# Patient Record
Sex: MALE | Race: WHITE | Employment: FULL TIME | URBAN - METROPOLITAN AREA
[De-identification: names, ages, dates, MRNs, and addresses within clinical notes are randomized per-mention and may not be internally consistent; named-entity substitution may affect disease eponyms.]

---

## 2019-08-01 ENCOUNTER — ED HISTORICAL/CONVERTED ENCOUNTER (OUTPATIENT)
Dept: OTHER | Age: 54
End: 2019-08-01

## 2020-08-31 ENCOUNTER — IP HISTORICAL/CONVERTED ENCOUNTER (OUTPATIENT)
Dept: OTHER | Age: 55
End: 2020-08-31

## 2020-11-04 ENCOUNTER — TRANSCRIBE ORDER (OUTPATIENT)
Dept: REGISTRATION | Age: 55
End: 2020-11-04

## 2020-11-04 ENCOUNTER — HOSPITAL ENCOUNTER (OUTPATIENT)
Dept: GENERAL RADIOLOGY | Age: 55
Discharge: HOME | End: 2020-11-04
Payer: COMMERCIAL

## 2020-11-04 ENCOUNTER — HOSPITAL ENCOUNTER (OUTPATIENT)
Dept: LAB | Age: 55
Discharge: HOME | End: 2020-11-04
Payer: COMMERCIAL

## 2020-11-04 DIAGNOSIS — J18.9 UNRESOLVED PNEUMONIA: ICD-10-CM

## 2020-11-04 DIAGNOSIS — R74.01 TRANSAMINITIS: Primary | ICD-10-CM

## 2020-11-04 DIAGNOSIS — R74.01 TRANSAMINITIS: ICD-10-CM

## 2020-11-04 DIAGNOSIS — J18.9 UNRESOLVED PNEUMONIA: Primary | ICD-10-CM

## 2020-11-04 LAB
ALBUMIN SERPL-MCNC: 4 G/DL (ref 3.5–5)
ALBUMIN/GLOB SERPL: 1.1 {RATIO} (ref 1.1–2.2)
ALP SERPL-CCNC: 118 U/L (ref 45–117)
ALT SERPL-CCNC: 180 U/L (ref 12–78)
AST SERPL W P-5'-P-CCNC: 82 U/L (ref 15–37)
BILIRUB DIRECT SERPL-MCNC: <0.1 MG/DL (ref 0–0.2)
BILIRUB SERPL-MCNC: 0.4 MG/DL (ref 0.2–1)
GLOBULIN SER CALC-MCNC: 3.8 G/DL (ref 2–4)
PROT SERPL-MCNC: 7.8 G/DL (ref 6.4–8.2)

## 2020-11-04 PROCEDURE — 36415 COLL VENOUS BLD VENIPUNCTURE: CPT

## 2020-11-04 PROCEDURE — 71046 X-RAY EXAM CHEST 2 VIEWS: CPT

## 2020-11-04 PROCEDURE — 80076 HEPATIC FUNCTION PANEL: CPT

## 2021-03-11 ENCOUNTER — OFFICE VISIT (OUTPATIENT)
Dept: HEMATOLOGY | Age: 56
End: 2021-03-11
Payer: COMMERCIAL

## 2021-03-11 VITALS
HEART RATE: 98 BPM | SYSTOLIC BLOOD PRESSURE: 115 MMHG | TEMPERATURE: 98.9 F | OXYGEN SATURATION: 96 % | DIASTOLIC BLOOD PRESSURE: 82 MMHG | WEIGHT: 198.2 LBS | HEIGHT: 73 IN | BODY MASS INDEX: 26.27 KG/M2 | RESPIRATION RATE: 18 BRPM

## 2021-03-11 DIAGNOSIS — R74.8 ELEVATED LIVER ENZYMES: Primary | ICD-10-CM

## 2021-03-11 PROBLEM — Z86.16 HISTORY OF COVID-19: Status: ACTIVE | Noted: 2021-03-11

## 2021-03-11 PROBLEM — Z98.890 H/O SINUS SURGERY: Status: ACTIVE | Noted: 2021-03-11

## 2021-03-11 PROBLEM — R05.3 CHRONIC COUGH: Status: ACTIVE | Noted: 2021-03-11

## 2021-03-11 LAB
ALBUMIN SERPL-MCNC: 3.9 G/DL (ref 3.5–5)
ALBUMIN/GLOB SERPL: 1.1 {RATIO} (ref 1.1–2.2)
ALP SERPL-CCNC: 108 U/L (ref 45–117)
ALT SERPL-CCNC: 89 U/L (ref 12–78)
ANION GAP SERPL CALC-SCNC: 7 MMOL/L (ref 5–15)
AST SERPL-CCNC: 40 U/L (ref 15–37)
BASOPHILS # BLD: 0 K/UL (ref 0–0.1)
BASOPHILS NFR BLD: 1 % (ref 0–1)
BILIRUB DIRECT SERPL-MCNC: 0.1 MG/DL (ref 0–0.2)
BILIRUB SERPL-MCNC: 0.4 MG/DL (ref 0.2–1)
BUN SERPL-MCNC: 13 MG/DL (ref 6–20)
BUN/CREAT SERPL: 13 (ref 12–20)
CALCIUM SERPL-MCNC: 9.4 MG/DL (ref 8.5–10.1)
CHLORIDE SERPL-SCNC: 109 MMOL/L (ref 97–108)
CO2 SERPL-SCNC: 24 MMOL/L (ref 21–32)
COMMENT, HOLDF: NORMAL
CREAT SERPL-MCNC: 1.04 MG/DL (ref 0.7–1.3)
DIFFERENTIAL METHOD BLD: ABNORMAL
EOSINOPHIL # BLD: 0.1 K/UL (ref 0–0.4)
EOSINOPHIL NFR BLD: 2 % (ref 0–7)
ERYTHROCYTE [DISTWIDTH] IN BLOOD BY AUTOMATED COUNT: 14.3 % (ref 11.5–14.5)
GLOBULIN SER CALC-MCNC: 3.6 G/DL (ref 2–4)
GLUCOSE SERPL-MCNC: 97 MG/DL (ref 65–100)
HCT VFR BLD AUTO: 44.6 % (ref 36.6–50.3)
HGB BLD-MCNC: 14.3 G/DL (ref 12.1–17)
IMM GRANULOCYTES # BLD AUTO: 0 K/UL (ref 0–0.04)
IMM GRANULOCYTES NFR BLD AUTO: 1 % (ref 0–0.5)
INR PPP: 1 (ref 0.9–1.1)
LYMPHOCYTES # BLD: 1.6 K/UL (ref 0.8–3.5)
LYMPHOCYTES NFR BLD: 26 % (ref 12–49)
MCH RBC QN AUTO: 26.8 PG (ref 26–34)
MCHC RBC AUTO-ENTMCNC: 32.1 G/DL (ref 30–36.5)
MCV RBC AUTO: 83.5 FL (ref 80–99)
MONOCYTES # BLD: 0.6 K/UL (ref 0–1)
MONOCYTES NFR BLD: 10 % (ref 5–13)
NEUTS SEG # BLD: 3.7 K/UL (ref 1.8–8)
NEUTS SEG NFR BLD: 60 % (ref 32–75)
NRBC # BLD: 0 K/UL (ref 0–0.01)
NRBC BLD-RTO: 0 PER 100 WBC
PLATELET # BLD AUTO: 218 K/UL (ref 150–400)
PMV BLD AUTO: 10 FL (ref 8.9–12.9)
POTASSIUM SERPL-SCNC: 4.2 MMOL/L (ref 3.5–5.1)
PROT SERPL-MCNC: 7.5 G/DL (ref 6.4–8.2)
PROTHROMBIN TIME: 10 SEC (ref 9–11.1)
RBC # BLD AUTO: 5.34 M/UL (ref 4.1–5.7)
SAMPLES BEING HELD,HOLD: NORMAL
SODIUM SERPL-SCNC: 140 MMOL/L (ref 136–145)
WBC # BLD AUTO: 6 K/UL (ref 4.1–11.1)

## 2021-03-11 PROCEDURE — 99204 OFFICE O/P NEW MOD 45 MIN: CPT | Performed by: INTERNAL MEDICINE

## 2021-03-11 RX ORDER — BROMPHENIRAMINE MALEATE, PSEUDOEPHEDRINE HYDROCHLORIDE, AND DEXTROMETHORPHAN HYDROBROMIDE 2; 30; 10 MG/5ML; MG/5ML; MG/5ML
SYRUP ORAL
COMMUNITY
Start: 2021-02-09 | End: 2021-09-27

## 2021-03-11 RX ORDER — ASCORBIC ACID 500 MG
1000 TABLET ORAL DAILY
COMMUNITY

## 2021-03-11 RX ORDER — FLUTICASONE FUROATE 27.5 UG/1
2 SPRAY, METERED NASAL DAILY
COMMUNITY
End: 2021-09-27

## 2021-03-11 RX ORDER — GABAPENTIN 300 MG/1
CAPSULE ORAL
COMMUNITY
Start: 2021-03-07 | End: 2021-04-13

## 2021-03-11 RX ORDER — TIOTROPIUM BROMIDE INHALATION SPRAY 3.12 UG/1
SPRAY, METERED RESPIRATORY (INHALATION)
COMMUNITY
Start: 2020-12-18 | End: 2021-04-13

## 2021-03-11 NOTE — PROGRESS NOTES
Identified pt with two pt identifiers(name and ). Reviewed record in preparation for visit and have obtained necessary documentation. No chief complaint on file. Vitals:    21 0946   BP: 115/82   Pulse: 98   Resp: 18   Temp: 98.9 °F (37.2 °C)   TempSrc: Temporal   SpO2: 96%   Weight: 198 lb 3.2 oz (89.9 kg)   Height: 6' 1\" (1.854 m)   PainSc:   0 - No pain       Health Maintenance Review: Patient reminded of \"due or due soon\" health maintenance. I have asked the patient to contact his/her primary care provider (PCP) for follow-up on his/her health maintenance. Coordination of Care Questionnaire:  :   1) Have you been to an emergency room, urgent care, or hospitalized since your last visit? If yes, where when, and reason for visit? yes     [facility]; August/sept For Covid-19    2. Have seen or consulted any other health care provider since your last visit? If yes, where when, and reason for visit? YES     Pulmonologist  Gastro    Patient is accompanied by self I have received verbal consent from rEum Gil to discuss any/all medical information while they are present in the room.

## 2021-03-11 NOTE — LETTER
3/20/2021 Patient: Yun Negron YOB: 1965 Date of Visit: 3/11/2021 Carolee Parra MD 
[address] [facility] [address] Via Fax: [phone] Jose Miguel Christianson MD 
[place] [address] [facility] [address] Via Fax: [phone] Dear MD Jose Miguel Ayala MD, Thank you for referring Mr. Yun Negron to [facility] for evaluation. My notes for this consultation are attached. If you have questions, please do not hesitate to call me. I look forward to following your patient along with you. Sincerely, Nadine Mckeon MD

## 2021-04-13 ENCOUNTER — OFFICE VISIT (OUTPATIENT)
Dept: HEMATOLOGY | Age: 56
End: 2021-04-13
Payer: COMMERCIAL

## 2021-04-13 VITALS
TEMPERATURE: 96.8 F | RESPIRATION RATE: 18 BRPM | HEIGHT: 73 IN | HEART RATE: 80 BPM | SYSTOLIC BLOOD PRESSURE: 117 MMHG | WEIGHT: 191.4 LBS | OXYGEN SATURATION: 95 % | DIASTOLIC BLOOD PRESSURE: 71 MMHG | BODY MASS INDEX: 25.37 KG/M2

## 2021-04-13 DIAGNOSIS — R74.8 ELEVATED LIVER ENZYMES: Primary | ICD-10-CM

## 2021-04-13 LAB
ALBUMIN SERPL-MCNC: 3.9 G/DL (ref 3.5–5)
ALBUMIN/GLOB SERPL: 1.1 {RATIO} (ref 1.1–2.2)
ALP SERPL-CCNC: 96 U/L (ref 45–117)
ALT SERPL-CCNC: 48 U/L (ref 12–78)
AST SERPL-CCNC: 21 U/L (ref 15–37)
BILIRUB DIRECT SERPL-MCNC: 0.1 MG/DL (ref 0–0.2)
BILIRUB SERPL-MCNC: 0.4 MG/DL (ref 0.2–1)
FERRITIN SERPL-MCNC: 260 NG/ML (ref 26–388)
GLOBULIN SER CALC-MCNC: 3.7 G/DL (ref 2–4)
IRON SATN MFR SERPL: 17 % (ref 20–50)
IRON SERPL-MCNC: 47 UG/DL (ref 35–150)
PROT SERPL-MCNC: 7.6 G/DL (ref 6.4–8.2)
TIBC SERPL-MCNC: 273 UG/DL (ref 250–450)

## 2021-04-13 PROCEDURE — 91200 LIVER ELASTOGRAPHY: CPT | Performed by: NURSE PRACTITIONER

## 2021-04-13 PROCEDURE — 99214 OFFICE O/P EST MOD 30 MIN: CPT | Performed by: NURSE PRACTITIONER

## 2021-04-13 NOTE — PROGRESS NOTES
Estrada Gonzales MD, [address], [place] Migel Gaona, Melany Weaver MD, MPH      Danita Holter, PA-ELIZABETH Parada, [facility], [place]     Shonna Manzanares, [facility]   LIA Fuchs-ELIZABETH Slade, [facility]       [facility] [place] [address]    at [address] [place], [address] Juliana Solitario  22.    [phone]    FAX: [address] [place] [address], [address]    [phone]    FAX: [phone]         Patient Care Team:  Juan David Kuhn MD as PCP - General (Internal Medicine)      Problem List  Date Reviewed: 3/20/2021          Codes Class Noted    Elevated liver enzymes ICD-10-CM: R74.8  ICD-9-CM: 790.5  3/11/2021        Chronic cough ICD-10-CM: R05  ICD-9-CM: 786.2  3/11/2021        History of COVID-19 ICD-10-CM: T28.74  ICD-9-CM: V12.09  3/11/2021        H/O sinus surgery ICD-10-CM: Z98.890  ICD-9-CM: V45.89  3/11/2021              Diana Riggs is being seen at [address] for management of elevated liver enzymes. The active problem list, all pertinent past medical history, medications, radiologic findings and laboratory findings related to the liver disorder were reviewed and discussed with the patient. The patient is a 63 y.o.  male who was found to have elevated liver transaminases in 2018. The patient was on Augmentin and the elevation was thought to be DILI. The liver enzymes remained elevated after discontinuation of the medication. Serologic evaluation for markers of chronic liver disease were negative. The patient was diagnosed with COVID 9/2020, liver enzymes elevated further but returned to baseline elevation after resolution. Imaging of the liver was not performed.      An assessment of liver fibrosis with biopsy or elastography has not been performed. He returns for Fibroscan today. Since the last office visit the patient reports neurological changes and development of a tremor. The patient has the following symptoms which are thought to be due to the liver disease: Fatigue. The patient is not currently experiencing the following symptoms of liver disease:  pain in the right side over the liver, itching, or abdominal swelling. The patient completes all daily activities without any functional limitations. ASSESSMENT AND PLAN:  Elevated liver enzymes  Persistent elevation in liver transaminases of unclear etiology at this time. Have performed laboratory testing to monitor liver function and degree of liver injury. This included BMP, hepatic panel, and CBC with platelet count. Laboratory testing from 3/11/2021 reviewed in detail. The ALP, liver function and platelet count are normal.     Assessment of liver fibrosis was performed with Fibroscan in the office today. The result was 4.6 kPa which correlates with no fibrosis. The CAP score of 360 suggests hepatic steatosis. The CAP score is elevated but the patient has no other risk factors for fatty liver. Discussed dietary changes and he reports not eating high carbohydrate foods such as pasta, bread, potatoes, and sweets. Will order additional testing to evaluate for other causes of liver disease. Based upon laboratory studies and Fibroscan, the patient does not appear to have significant liver injury. Additional serologic testing for causes of chronic liver disease were ordered. The most likely causes for the liver chemistry abnormalities were discussed with the patient and include:   fatty liver disease, immune liver disorders, or genetic metabolic liver disorders. Will perform imaging of the liver with ultrasound.       Screening for Hepatocellular Carcinoma  HCC screening is not necessary if the patient has no evidence of cirrhosis. Treatment of other medical problems in patients with chronic liver disease  There are no contraindications for the patient to take most medications that are necessary for treatment of other medical issues. Counseling for alcohol in patients with chronic liver disease  The patient was counseled regarding alcohol consumption and the effect of alcohol on chronic liver disease. The patient does not consume any significant amount of alcohol. Vaccinations   The need for vaccination against viral hepatitis A and B will be assessed with serologic and instituted as appropriate. Routine vaccinations against other bacterial and viral agents can be performed as indicated. Annual flu vaccination should be administered if indicated. ALLERGIES  Allergies   Allergen Reactions    Amoxicillin Other (comments)     \"Blood work related to the liver comes back elevated\"    Sulfa (Sulfonamide Antibiotics) Hives and Swelling       MEDICATIONS  Current Outpatient Medications   Medication Sig    brompheniramine-pseudoeph-DM (DIMETAPP) 2-30-10 mg/5 mL syrup TAKE 10 ML BY MOUTH EVERY 4 HOURS AS NEEDED    gabapentin (NEURONTIN) 300 mg capsule     Spiriva Respimat 2.5 mcg/actuation inhaler INHALE 2 PUFFS BY MOUTH DAILY. RINSE MOUTH AFTER USE.  ascorbic acid, vitamin C, (Vitamin C) 500 mg tablet Take 1,000 mg by mouth daily.  VITAMIN E ACETATE PO Take 360 mg by mouth.  multivit-min/FA/lycopen/lutein (CENTRUM SILVER MEN PO) Take  by mouth.  fluticasone (Flonase Sensimist) 27.5 mcg/actuation nasal spray 2 Sprays by Nasal route daily. No current facility-administered medications for this visit. SYSTEM REVIEW NOT RELATED TO LIVER DISEASE OR REVIEWED ABOVE:  Constitution systems: Negative for fever, chills, weight gain, weight loss. Eyes: Negative for visual changes. ENT: Negative for sore throat, painful swallowing. Respiratory: Negative for cough, hemoptysis, SOB. Cardiology: Negative for chest pain, palpitations. GI:  Negative for constipation or diarrhea. : Negative for urinary frequency, dysuria, hematuria, nocturia. Skin: Negative for rash. Hematology: Negative for easy bruising, blood clots. Musculo-skeletal: Negative for back pain, muscle pain, weakness. Neurologic: Negative for headaches, dizziness, vertigo, memory problems not related to HE. New onset resting tremor. Psychology: Negative for anxiety, depression. FAMILY HISTORY:  The father  of DM. The mother Has/had the following chronic disease(s): breast cancer. There is a grandfather with cirrhosis    SOCIAL HISTORY:  The patient is . The patient has 3 children,   The patient has never used tobacco products. The patient consumes alcohol on social occasions never in excess. The patient currently works full time as . PHYSICAL EXAMINATION:  Visit Vitals  /71 (BP 1 Location: Right upper arm, BP Patient Position: Sitting, BP Cuff Size: Adult)   Pulse 80   Temp 96.8 °F (36 °C) (Temporal)   Resp 18   Ht 6' 1\" (1.854 m)   Wt 191 lb 6.4 oz (86.8 kg)   SpO2 95%   BMI 25.25 kg/m²       General: No acute distress. Eyes: Sclera anicteric. ENT: No oral lesions. Thyroid normal.  Nodes: No adenopathy. Skin: No spider angiomata. No jaundice. No palmar erythema. Respiratory: Lungs clear to auscultation. Cardiovascular: Regular heart rate. No murmurs. No JVD. Abdomen: Soft non-tender, liver size normal to percussion/palpation. Spleen not palpable. No obvious ascites. Extremities: No edema. No muscle wasting. No gross arthritic changes. Neurologic: Alert and oriented. Cranial nerves grossly intact. No asterixis. Tremor involving head movements observed.      LABORATORY STUDIES:  Liver [place] of [address] & Units 3/11/2021 2020   WBC 4.1 - 11.1 K/uL 6.0    ANC 1.8 - 8.0 K/UL 3.7    HGB 12.1 - 17.0 g/dL 14.3     - 400 K/uL 218    INR 0.9 - 1.1   1.0    AST 15 - 37 U/L 40 (H) 82 (H)   ALT 12 - 78 U/L 89 (H) 180 (H)   Alk Phos 45 - 117 U/L 108 118 (H)   Bili, Total 0.2 - 1.0 MG/DL 0.4 0.4   Bili, Direct 0.0 - 0.2 MG/DL 0.1 <0.1   Albumin 3.5 - 5.0 g/dL 3.9 4.0   BUN 6 - 20 MG/DL 13    Creat 0.70 - 1.30 MG/DL 1.04    Na 136 - 145 mmol/L 140    K 3.5 - 5.1 mmol/L 4.2    Cl 97 - 108 mmol/L 109 (H)    CO2 21 - 32 mmol/L 24      Laboratory testing from 3/11/2021 reviewed in detail. Additional testing included to evaluate progression or regression of disease. Laboratory testing results from today will be communicated by phone or mail. SEROLOGIES:  2/2018. anti-HBcore negative, anti-HCV negative, Ferritin 348, iron saturation 39%, MOI negative, Anti-LKM negative, AMA negative, ceruloplsmin 31, alpha-1-antitrypsin 95, TSH 2.4    LIVER HISTOLOGY:  4/2021. [facility]. EkPa was 4.6. . The results suggested a fibrosis level of F0. The CAP score suggests there is hepatic steatosis. ENDOSCOPIC PROCEDURES:  3/2018. Colonoscopy. RADIOLOGY:  Not available or performed    OTHER TESTING:  Not available or performed    FOLLOW-UP:  All of the issues listed above in the Assessment and Plan were discussed with the patient. All questions were answered. The patient expressed a clear understanding of the above. [facility] 87 in 3 months. Imaging will be performed in the next 1-2 weeks. April JEEVAN Bell-BC  [address] [address] Asc[address] Nugent, [address] [facility] [address].  [address] [facility]

## 2021-04-13 NOTE — PROGRESS NOTES
Identified pt with two pt identifiers(name and ). Reviewed record in preparation for visit and have obtained necessary documentation. No chief complaint on file. Vitals:    21 1416   BP: 117/71   Pulse: 80   Resp: 18   Temp: 96.8 °F (36 °C)   TempSrc: Temporal   SpO2: 95%   Weight: 191 lb 6.4 oz (86.8 kg)   Height: 6' 1\" (1.854 m)   PainSc:   0 - No pain       Health Maintenance Review: Patient reminded of \"due or due soon\" health maintenance. I have asked the patient to contact his/her primary care provider (PCP) for follow-up on his/her health maintenance. Coordination of Care Questionnaire:  :   1) Have you been to an emergency room, urgent care, or hospitalized since your last visit? If yes, where when, and reason for visit? no       2. Have seen or consulted any other health care provider since your last visit? If yes, where when, and reason for visit? NO      Patient is accompanied by self I have received verbal consent from Karol Renee to discuss any/all medical information while they are present in the room.

## 2021-04-15 LAB
A1AT SERPL-MCNC: 123 MG/DL (ref 101–187)
ANA TITR SER IF: NEGATIVE {TITER}
C-ANCA TITR SER IF: NORMAL TITER
CERULOPLASMIN SERPL-MCNC: 30.4 MG/DL (ref 16–31)
P-ANCA ATYPICAL TITR SER IF: NORMAL TITER
P-ANCA TITR SER IF: NORMAL TITER

## 2021-04-16 NOTE — PROGRESS NOTES
Letter sent to the patient regarding the blood work results. The liver numbers continue to improve and testing for other causes of liver disease were negative. We will see him back in 3 months.

## 2021-04-17 LAB — ACTIN IGG SERPL-ACNC: 27 UNITS (ref 0–19)

## 2021-04-28 NOTE — PROGRESS NOTES
The ASMA was slightly elevated. The liver numbers are improving with diet changes. This may be falsely elevated due to inflammation. Will monitor closely.

## 2021-05-12 ENCOUNTER — HOSPITAL ENCOUNTER (OUTPATIENT)
Dept: ULTRASOUND IMAGING | Age: 56
Discharge: HOME | End: 2021-05-12
Payer: COMMERCIAL

## 2021-05-12 DIAGNOSIS — R74.8 ELEVATED LIVER ENZYMES: ICD-10-CM

## 2021-05-12 PROCEDURE — 76700 US EXAM ABDOM COMPLETE: CPT

## 2021-05-19 NOTE — PROGRESS NOTES
Letter sent to the patient regarding the ultrasound result which demonstrates no concerning liver mass or lesion and changes consistent with steatosis.

## 2021-06-21 ENCOUNTER — OFFICE VISIT (OUTPATIENT)
Dept: NEUROLOGY | Age: 56
End: 2021-06-21
Payer: COMMERCIAL

## 2021-06-21 VITALS
HEART RATE: 92 BPM | WEIGHT: 195 LBS | SYSTOLIC BLOOD PRESSURE: 122 MMHG | DIASTOLIC BLOOD PRESSURE: 78 MMHG | OXYGEN SATURATION: 97 % | BODY MASS INDEX: 25.84 KG/M2 | HEIGHT: 73 IN

## 2021-06-21 DIAGNOSIS — Z86.16 PERSONAL HISTORY OF COVID-19: ICD-10-CM

## 2021-06-21 DIAGNOSIS — G25.0 BENIGN HEAD TREMOR: Primary | ICD-10-CM

## 2021-06-21 DIAGNOSIS — Z92.3 STATUS POST GAMMA KNIFE TREATMENT: ICD-10-CM

## 2021-06-21 DIAGNOSIS — Z86.018 HISTORY OF BENIGN SCHWANNOMA: ICD-10-CM

## 2021-06-21 PROCEDURE — 99243 OFF/OP CNSLTJ NEW/EST LOW 30: CPT | Performed by: PSYCHIATRY & NEUROLOGY

## 2021-06-21 NOTE — PROGRESS NOTES
Chief Complaint   Patient presents with    Neurologic Problem     twitching around the neck, \"Started last year after I had covid, but its becoming more frequent especially in the afternoon. \"       Referred by: Dr. Mathew Mike      HPI    Mr. Ilana Simon is a 25-year-old gentleman with a history of right-sided schwannoma status post gamma knife with residual hearing loss who is here because of head shaking. He is not sure how long this has been going on exactly but he thinks it started after a Covid infection in fall 2020. His infection was severe enough he was in the hospital for a week on supplemental oxygen but not in the critical care unit. He is since recovered pulmonary wise from that issue but the tremor is getting worse such that his family is noticing it mainly in the evenings after he gets home. He also thinks it might be a little bit in the right hand. He spends all day working remotely from home and is very focused and he has not noticed it. No speech changes or vision changes. No loss of strength. No numbness. Symptoms do not interfere with daily activity. Nobody in the family he can think of with tremor. No difficulty swallowing. Review of Systems   Gastrointestinal: Negative for constipation. Neurological: Positive for tremors. Psychiatric/Behavioral: Negative for hallucinations. All other systems reviewed and are negative. No past medical history on file. No family history on file. Social History     Socioeconomic History    Marital status:      Spouse name: Not on file    Number of children: Not on file    Years of education: Not on file    Highest education level: Not on file   Occupational History    Not on file   Tobacco Use    Smoking status: Former Smoker     Types: Cigarettes    Smokeless tobacco: Never Used   Vaping Use    Vaping Use: Never used   Substance and Sexual Activity    Alcohol use:  Yes    Drug use: Not Currently    Sexual activity: Not on file   Other Topics Concern    Not on file   Social History Narrative    Not on file     Social Determinants of Health     Financial Resource Strain:     Difficulty of Paying Living Expenses:    Food Insecurity:     Worried About Running Out of Food in the Last Year:     [address] in the Last Year:    Transportation Needs:     Lack of Transportation (Medical):  Lack of Transportation (Non-Medical):    Physical Activity:     Days of Exercise per Week:     Minutes of Exercise per Session:    Stress:     Feeling of Stress :    Social Connections:     Frequency of Communication with Friends and Family:     Frequency of Social Gatherings with Friends and Family:     Attends [employer] Services:     Active Member of Clubs or Organizations:     Attends Club or Organization Meetings:     Marital Status:    Intimate Partner Violence:     Fear of Current or Ex-Partner:     Emotionally Abused:     Physically Abused:     Sexually Abused:      Current Outpatient Medications   Medication Sig    ascorbic acid, vitamin C, (Vitamin C) 500 mg tablet Take 1,000 mg by mouth daily.  VITAMIN E ACETATE PO Take 360 mg by mouth.  multivit-min/FA/lycopen/lutein (CENTRUM SILVER MEN PO) Take  by mouth.  fluticasone (Flonase Sensimist) 27.5 mcg/actuation nasal spray 2 Sprays by Nasal route daily.  brompheniramine-pseudoeph-DM (DIMETAPP) 2-30-10 mg/5 mL syrup TAKE 10 ML BY MOUTH EVERY 4 HOURS AS NEEDED (Patient not taking: Reported on 6/21/2021)     No current facility-administered medications for this visit. Allergies   Allergen Reactions    Amoxicillin Other (comments)     \"Blood work related to the liver comes back elevated\"    Sulfa (Sulfonamide Antibiotics) Hives and Swelling         Neurologic Exam     Mental Status   Oriented to person, place, and time. Cranial Nerves   Hearing loss on the right with right ptosis     Motor Exam   Muscle bulk: normal    Strength   Strength 5/5 throughout. Sensory Exam   Light touch normal.     Gait, Coordination, and Reflexes     Gait  Gait: normal    Tremor   Resting tremor: absent  Intention tremor: present (Kinetic tremor bilaterally)No clear postural tremor     Physical Exam  Vitals and nursing note reviewed. Neurological:      Mental Status: He is oriented to person, place, and time. Gait: Gait is intact. Deep Tendon Reflexes: Strength normal.       Visit Vitals  /78 (BP 1 Location: Left upper arm, BP Patient Position: Sitting)   Pulse 92   Ht 6' 1\" (1.854 m)   Wt 195 lb (88.5 kg)   SpO2 97%   BMI 25.73 kg/m²       Lab Results   Component Value Date/Time    WBC 6.0 03/11/2021 10:35 AM    HGB 14.3 03/11/2021 10:35 AM    HCT 44.6 03/11/2021 10:35 AM    PLATELET 807 40/29/5630 10:35 AM    MCV 83.5 03/11/2021 10:35 AM     Lab Results   Component Value Date/Time    Glucose 97 03/11/2021 10:35 AM    Creatinine 1.04 03/11/2021 10:35 AM      No results found for: CHOL, CHOLPOCT, HDL, LDL, LDLC, LDLCPOC, LDLCEXT, TRIGL, TGLPOCT, CHHD, CHHDX  Lab Results   Component Value Date/Time    ALT (SGPT) 48 04/13/2021 03:05 PM    Alk. phosphatase 96 04/13/2021 03:05 PM    Bilirubin, direct 0.1 04/13/2021 03:05 PM    Bilirubin, total 0.4 04/13/2021 03:05 PM    Albumin 3.9 04/13/2021 03:05 PM    Protein, total 7.6 04/13/2021 03:05 PM    INR 1.0 03/11/2021 10:35 AM    Prothrombin time 10.0 03/11/2021 10:35 AM    PLATELET 041 92/76/7578 10:35 AM        CT Results (maximum last 3): No results found for this or any previous visit. MRI Results (maximum last 3): No results found for this or any previous visit. PET Results (maximum last 3): No results found for this or any previous visit. Assessment and Plan   Diagnoses and all orders for this visit:    1. Benign head tremor  -     MRI BRAIN WO CONT; Future    2. History of benign schwannoma  -     MRI BRAIN WO CONT; Future    3.  Status post gamma knife treatment  -     MRI BRAIN WO CONT; Future    4. Personal history of COVID-23     48-year-old gentleman who is presenting with a new head tremor. I was able to observe this in the clinic. It looks like a benign head tremor. Unclear how to correlate this with timing of Covid infection. I think I would follow this clinically. However, given the history of gamma knife surgery and schwannoma I am going to get a new brain scan to make sure were not dealing with anything new particularly given the history of Covid infection. I offered treatment however because the symptoms were not debilitating I think it is reasonable to forego treatment and he agrees. I would like to see him after the new year to reassess. The next time he has any type of alcoholic beverage I would recommend he see if the tremor gets less as well. We will be in touch after the imaging is done. If things get worse prior to that let us know otherwise I will see him in follow-up. 45 minutes of time was spent reviewing the medical record with a significant amount of time face-to-face discussing his symptoms and visualizing the symptoms, completion of documentation. I reviewed and decided to continue the current medications. This clinical note was dictated with an electronic dictation software that can make unintentional errors. If there are any questions, please contact me directly for clarification. A notice of this visit/encounter being completed has been sent electronically to the patient's PCP and/or referring provider.      [address] [facility], [facility] [address]  Diplomate CATHY

## 2021-06-21 NOTE — LETTER
6/21/2021 Patient: Giovanni Rueda YOB: 1965 Date of Visit: 6/21/2021 Alma Dangelo MD 
[address] [address] [address] Fax: [phone] Dear Alma Dangelo MD, Thank you for referring Mr. Giovanni Rueda to [address] [place] [address] for evaluation. My notes for this consultation are attached. If you have questions, please do not hesitate to call me. I look forward to following your patient along with you. Sincerely, [address] [place] [address], DO 
 
6/21/2021 Patient:  Giovanni Rueda YOB: 1965 Date of Visit: 6/21/2021 Dear Alma Dangelo MD 
[address] [place] [address] Via Fax: [phone]: I was requested by Jass Montalvo MD to evaluate Mr. Giovanni Rueda  for Chief Complaint Patient presents with  Neurologic Problem  
  twitching around the neck, \"Started last year after I had covid, but its becoming more frequent especially in the afternoon. \" .  
 
I am recommending the following:  
 
Diagnoses and all orders for this visit: 1. Benign head tremor -     MRI BRAIN WO CONT; Future 2. History of benign schwannoma -     MRI BRAIN WO CONT; Future 3. Status post gamma knife treatment -     MRI BRAIN WO CONT; Future 4. Personal history of COVID-19 
 
 
 
---------------------------------------------------------------------------------------------------------------------- Below is my encounter: Chief Complaint Patient presents with  Neurologic Problem  
  twitching around the neck, \"Started last year after I had covid, but its becoming more frequent especially in the afternoon. \"  
 
 
Referred by: Dr. Epperson Officer HPI Mr. Kathe Bates is a 58-year-old gentleman with a history of right-sided schwannoma status post gamma knife with residual hearing loss who is here because of head shaking.   He is not sure how long this has been going on exactly but he thinks it started after a Covid infection in fall 2020. His infection was severe enough he was in the hospital for a week on supplemental oxygen but not in the critical care unit. He is since recovered pulmonary wise from that issue but the tremor is getting worse such that his family is noticing it mainly in the evenings after he gets home. He also thinks it might be a little bit in the right hand. He spends all day working remotely from home and is very focused and he has not noticed it. No speech changes or vision changes. No loss of strength. No numbness. Symptoms do not interfere with daily activity. Nobody in the family he can think of with tremor. No difficulty swallowing. Review of Systems Gastrointestinal: Negative for constipation. Neurological: Positive for tremors. Psychiatric/Behavioral: Negative for hallucinations. All other systems reviewed and are negative. No past medical history on file. No family history on file. Social History Socioeconomic History  Marital status:  Spouse name: Not on file  Number of children: Not on file  Years of education: Not on file  Highest education level: Not on file Occupational History  Not on file Tobacco Use  Smoking status: Former Smoker Types: Cigarettes  Smokeless tobacco: Never Used Vaping Use  Vaping Use: Never used Substance and Sexual Activity  Alcohol use: Yes  Drug use: Not Currently  Sexual activity: Not on file Other Topics Concern  Not on file Social History Narrative  Not on file Social Determinants of Health Financial Resource Strain:  Difficulty of Paying Living Expenses:   
Food Insecurity:  Worried About [address] in the Last Year:   
[address] in the Last Year:   
Transportation Needs:   
 Lack of Transportation (Medical):  Lack of Transportation (Non-Medical): Physical Activity:   
 Days of Exercise per Week:  Minutes of Exercise per Session:   
Stress:  Feeling of Stress :   
Social Connections:  Frequency of Communication with Friends and Family:  Frequency of Social Gatherings with Friends and Family:  Attends [employer] Services:  Active Member of Clubs or Organizations:  Attends Club or Organization Meetings:  Marital Status: Intimate Partner Violence:  Fear of Current or Ex-Partner:  Emotionally Abused:   
 Physically Abused:   
 Sexually Abused:   
 
Current Outpatient Medications Medication Sig  
 ascorbic acid, vitamin C, (Vitamin C) 500 mg tablet Take 1,000 mg by mouth daily.  VITAMIN E ACETATE PO Take 360 mg by mouth.  multivit-min/FA/lycopen/lutein (CENTRUM SILVER MEN PO) Take  by mouth.  fluticasone (Flonase Sensimist) 27.5 mcg/actuation nasal spray 2 Sprays by Nasal route daily.  brompheniramine-pseudoeph-DM (DIMETAPP) 2-30-10 mg/5 mL syrup TAKE 10 ML BY MOUTH EVERY 4 HOURS AS NEEDED (Patient not taking: Reported on 6/21/2021) No current facility-administered medications for this visit. Allergies Allergen Reactions  Amoxicillin Other (comments) \"Blood work related to the liver comes back elevated\"  Sulfa (Sulfonamide Antibiotics) Hives and Swelling Neurologic Exam  
 
Mental Status Oriented to person, place, and time. Cranial Nerves Hearing loss on the right with right ptosis Motor Exam  
Muscle bulk: normal 
 
Strength Strength 5/5 throughout. Sensory Exam  
Light touch normal.  
 
Gait, Coordination, and Reflexes Gait Gait: normal 
 
Tremor Resting tremor: absent Intention tremor: present (Kinetic tremor bilaterally)No clear postural tremor Physical Exam 
Vitals and nursing note reviewed. Neurological:  
   Mental Status: He is oriented to person, place, and time. Gait: Gait is intact. Deep Tendon Reflexes: Strength normal.  
 
 
Visit Vitals /78 (BP 1 Location: Left upper arm, BP Patient Position: Sitting) Pulse 92 Ht 6' 1\" (1.854 m) Wt 195 lb (88.5 kg) SpO2 97% BMI 25.73 kg/m² Lab Results Component Value Date/Time WBC 6.0 03/11/2021 10:35 AM  
 HGB 14.3 03/11/2021 10:35 AM  
 HCT 44.6 03/11/2021 10:35 AM  
 PLATELET 585 29/72/9780 10:35 AM  
 MCV 83.5 03/11/2021 10:35 AM  
 
Lab Results Component Value Date/Time Glucose 97 03/11/2021 10:35 AM  
 Creatinine 1.04 03/11/2021 10:35 AM  
  
No results found for: CHOL, CHOLPOCT, HDL, LDL, LDLC, LDLCPOC, LDLCEXT, TRIGL, TGLPOCT, CHHD, CHHDX Lab Results Component Value Date/Time ALT (SGPT) 48 04/13/2021 03:05 PM  
 Alk. phosphatase 96 04/13/2021 03:05 PM  
 Bilirubin, direct 0.1 04/13/2021 03:05 PM  
 Bilirubin, total 0.4 04/13/2021 03:05 PM  
 Albumin 3.9 04/13/2021 03:05 PM  
 Protein, total 7.6 04/13/2021 03:05 PM  
 INR 1.0 03/11/2021 10:35 AM  
 Prothrombin time 10.0 03/11/2021 10:35 AM  
 PLATELET 010 63/66/7047 10:35 AM  
  
 
CT Results (maximum last 3): No results found for this or any previous visit. MRI Results (maximum last 3): No results found for this or any previous visit. PET Results (maximum last 3): No results found for this or any previous visit. Assessment and Plan Diagnoses and all orders for this visit: 1. Benign head tremor -     MRI BRAIN WO CONT; Future 2. History of benign schwannoma -     MRI BRAIN WO CONT; Future 3. Status post gamma knife treatment -     MRI BRAIN WO CONT; Future 4. Personal history of COVID-23
 
 
79-year-old gentleman who is presenting with a new head tremor. I was able to observe this in the clinic. It looks like a benign head tremor. Unclear how to correlate this with timing of Covid infection. I think I would follow this clinically.   However, given the history of gamma knife surgery and schwannoma I am going to get a new brain scan to make sure were not dealing with anything new particularly given the history of Covid infection. I offered treatment however because the symptoms were not debilitating I think it is reasonable to forego treatment and he agrees. I would like to see him after the new year to reassess. The next time he has any type of alcoholic beverage I would recommend he see if the tremor gets less as well. We will be in touch after the imaging is done. If things get worse prior to that let us know otherwise I will see him in follow-up. 45 minutes of time was spent reviewing the medical record with a significant amount of time face-to-face discussing his symptoms and visualizing the symptoms, completion of documentation. I reviewed and decided to continue the current medications. This clinical note was dictated with an electronic dictation software that can make unintentional errors. If there are any questions, please contact me directly for clarification. Thank you for giving me the opportunity to assist in the care of Mr. Kd Cheng. If you have questions, please do not hesitate to contact me. Sincerely, [facility], DO Neurologist 
Brain Injury Medicine Diplomate CATHY

## 2021-06-21 NOTE — PROGRESS NOTES
Chief Complaint   Patient presents with    Neurologic Problem     twitching around the neck, \"Started last year after I had covid, but its becoming more frequent especially in the afternoon. \"     Visit Vitals  /78 (BP 1 Location: Left upper arm, BP Patient Position: Sitting)   Pulse 92   Ht 6' 1\" (1.854 m)   Wt 195 lb (88.5 kg)   SpO2 97%   BMI 25.73 kg/m²

## 2021-06-30 ENCOUNTER — HOSPITAL ENCOUNTER (OUTPATIENT)
Dept: MRI IMAGING | Age: 56
Discharge: HOME | End: 2021-06-30
Attending: PSYCHIATRY & NEUROLOGY
Payer: COMMERCIAL

## 2021-06-30 DIAGNOSIS — Z92.3 STATUS POST GAMMA KNIFE TREATMENT: ICD-10-CM

## 2021-06-30 DIAGNOSIS — Z86.018 HISTORY OF BENIGN SCHWANNOMA: ICD-10-CM

## 2021-06-30 DIAGNOSIS — G25.0 BENIGN HEAD TREMOR: ICD-10-CM

## 2021-06-30 PROCEDURE — 70551 MRI BRAIN STEM W/O DYE: CPT

## 2021-06-30 NOTE — PROGRESS NOTES
Please let him know the brain scan looks okay. The history of schwannoma has expected changes but nothing new. They were able to compare this MRI to a study done last year. No new developments. No signs of inflammation from Covid. No stroke. No multiple sclerosis.

## 2021-09-27 ENCOUNTER — OFFICE VISIT (OUTPATIENT)
Dept: HEMATOLOGY | Age: 56
End: 2021-09-27
Payer: COMMERCIAL

## 2021-09-27 VITALS
WEIGHT: 195 LBS | HEART RATE: 90 BPM | RESPIRATION RATE: 14 BRPM | TEMPERATURE: 96.3 F | BODY MASS INDEX: 25.84 KG/M2 | SYSTOLIC BLOOD PRESSURE: 133 MMHG | DIASTOLIC BLOOD PRESSURE: 85 MMHG | OXYGEN SATURATION: 98 % | HEIGHT: 73 IN

## 2021-09-27 DIAGNOSIS — K76.0 FATTY LIVER: ICD-10-CM

## 2021-09-27 DIAGNOSIS — R74.8 ELEVATED LIVER ENZYMES: Primary | ICD-10-CM

## 2021-09-27 LAB
ALBUMIN SERPL-MCNC: 3.9 G/DL (ref 3.5–5)
ALBUMIN/GLOB SERPL: 1.1 {RATIO} (ref 1.1–2.2)
ALP SERPL-CCNC: 83 U/L (ref 45–117)
ALT SERPL-CCNC: 56 U/L (ref 12–78)
ANION GAP SERPL CALC-SCNC: 4 MMOL/L (ref 5–15)
AST SERPL-CCNC: 28 U/L (ref 15–37)
BASOPHILS # BLD: 0.1 K/UL (ref 0–0.1)
BASOPHILS NFR BLD: 1 % (ref 0–1)
BILIRUB DIRECT SERPL-MCNC: 0.1 MG/DL (ref 0–0.2)
BILIRUB SERPL-MCNC: 0.5 MG/DL (ref 0.2–1)
BUN SERPL-MCNC: 13 MG/DL (ref 6–20)
BUN/CREAT SERPL: 12 (ref 12–20)
CALCIUM SERPL-MCNC: 9.3 MG/DL (ref 8.5–10.1)
CHLORIDE SERPL-SCNC: 109 MMOL/L (ref 97–108)
CO2 SERPL-SCNC: 26 MMOL/L (ref 21–32)
CREAT SERPL-MCNC: 1.09 MG/DL (ref 0.7–1.3)
DIFFERENTIAL METHOD BLD: NORMAL
EOSINOPHIL # BLD: 0.1 K/UL (ref 0–0.4)
EOSINOPHIL NFR BLD: 1 % (ref 0–7)
ERYTHROCYTE [DISTWIDTH] IN BLOOD BY AUTOMATED COUNT: 14.2 % (ref 11.5–14.5)
GLOBULIN SER CALC-MCNC: 3.6 G/DL (ref 2–4)
GLUCOSE SERPL-MCNC: 113 MG/DL (ref 65–100)
HCT VFR BLD AUTO: 45.4 % (ref 36.6–50.3)
HGB BLD-MCNC: 14.6 G/DL (ref 12.1–17)
IMM GRANULOCYTES # BLD AUTO: 0 K/UL (ref 0–0.04)
IMM GRANULOCYTES NFR BLD AUTO: 0 % (ref 0–0.5)
LYMPHOCYTES # BLD: 1.8 K/UL (ref 0.8–3.5)
LYMPHOCYTES NFR BLD: 33 % (ref 12–49)
MCH RBC QN AUTO: 27.1 PG (ref 26–34)
MCHC RBC AUTO-ENTMCNC: 32.2 G/DL (ref 30–36.5)
MCV RBC AUTO: 84.2 FL (ref 80–99)
MONOCYTES # BLD: 0.3 K/UL (ref 0–1)
MONOCYTES NFR BLD: 6 % (ref 5–13)
NEUTS SEG # BLD: 3.1 K/UL (ref 1.8–8)
NEUTS SEG NFR BLD: 59 % (ref 32–75)
NRBC # BLD: 0 K/UL (ref 0–0.01)
NRBC BLD-RTO: 0 PER 100 WBC
PLATELET # BLD AUTO: 208 K/UL (ref 150–400)
PMV BLD AUTO: 9.9 FL (ref 8.9–12.9)
POTASSIUM SERPL-SCNC: 4 MMOL/L (ref 3.5–5.1)
PROT SERPL-MCNC: 7.5 G/DL (ref 6.4–8.2)
RBC # BLD AUTO: 5.39 M/UL (ref 4.1–5.7)
SODIUM SERPL-SCNC: 139 MMOL/L (ref 136–145)
WBC # BLD AUTO: 5.4 K/UL (ref 4.1–11.1)

## 2021-09-27 PROCEDURE — 99213 OFFICE O/P EST LOW 20 MIN: CPT | Performed by: NURSE PRACTITIONER

## 2021-09-27 NOTE — PROGRESS NOTES
[address] [facility], Donaldo BEEBE, Gustavo Cook MD, MPH      Vi Valdez, LARA Al[place], [facility]     Shonna HARPER Glenna, [facility]   ASHTYN Newton, [facility]       Georgie BurnsRe[facility] [address]    at [address], [address] Juliana Solitario  22.    [phone]    FAX: [address] [place] [address], [address]    [phone]    FAX: [phone]         Patient Care Team:  Carrillo Arreola MD as PCP - General (Internal Medicine)      Problem List  Date Reviewed: 4/13/2021        Codes Class Noted    Fatty liver ICD-10-CM: K76.0  ICD-9-CM: 571.8  3/11/2021        Chronic cough ICD-10-CM: R05  ICD-9-CM: 786.2  3/11/2021        History of COVID-19 ICD-10-CM: O64.95  ICD-9-CM: V12.09  3/11/2021        H/O sinus surgery ICD-10-CM: Z98.890  ICD-9-CM: V45.89  3/11/2021              Braden Alvarez is being seen at [facility] for management of elevated liver enzymes that is suspected to be from fatty liver. The active problem list, all pertinent past medical history, medications, radiologic findings and laboratory findings related to the liver disorder were reviewed and discussed with the patient. The patient is a 61 y.o.  male who was found to have elevated liver transaminases in 2018. The patient was on Augmentin and the elevation was thought to be DILI. The liver enzymes remained elevated after discontinuation of the medication. Serologic evaluation for markers of chronic liver disease were negative. The patient was diagnosed with COVID 9/2020, liver enzymes elevated further but returned to baseline elevation after resolution.      Assessment of liver fibrosis was performed with Fibroscan 4/2021. The result was 4.6 kPa which correlates with no fibrosis. The CAP score of 360 suggests hepatic steatosis. Since the last office visit the patient has not been focusing on dietary changes. Weight was increased 4 lbs. The patient has the following symptoms which are thought to be due to the liver disease: Fatigue. The patient is not currently experiencing the following symptoms of liver disease:  pain in the right side over the liver, itching, or abdominal swelling. The patient completes all daily activities without any functional limitations. ASSESSMENT AND PLAN:  Fatty liver  The diagnosis is based upon imaging, and Fiboscan CAP score. Assessment of liver fibrosis was performed with Fibroscan 4/2021. The result was 4.6 kPa which correlates with no fibrosis. The CAP score of 360 suggests hepatic steatosis. Imaging with ultrasound was performed 5/2021. The result suggests changes consistent with steatosis. Have performed laboratory testing to monitor liver function and degree of liver injury. This included BMP, hepatic panel, and CBC with platelet count. Laboratory testing from 4/13/2021 reviewed in detail. Follow-up testing ordered today. The AST is normal. The ALT is elevated. The ALP is normal. The liver function and platelet count are normal.     Based upon laboratory studies, Fibroscan, and imaging  the patient does not appear to have significant liver injury. The ASMA is positive which could be falsely elevated due to inflammation or he has an underlying autoimmune liver disease. The patient does not have a significant amount of weight to lose. Recommend decreasing portion sizes of bread, pasta, potatoes, and sweets. If the liver enzymes do not improve with dietary changes and the Fibroscan liver stiffness score increases a liver biopsy may be needed to determine the underlying cause of liver disease.      Counseling for diet and weight loss in patients with confirmed or suspected NAFLD  The patient was counseled regarding diet and exercise to achieve weight loss. The best diet for patients with fatty liver is one very low in carbohydrates and enriched with protein such as an Alexia's program.      The patient was told not to consume any food products and drinks containing fructose as this enhances hepatic fat synthesis. Screening for Hepatocellular Carcinoma  HCC screening is not necessary if the patient has no evidence of cirrhosis. Treatment of other medical problems in patients with chronic liver disease  There are no contraindications for the patient to take most medications that are necessary for treatment of other medical issues. Counseling for alcohol in patients with chronic liver disease  The patient was counseled regarding alcohol consumption and the effect of alcohol on chronic liver disease. The patient does not consume any significant amount of alcohol. Vaccinations   The need for vaccination against viral hepatitis A and B will be assessed with serologic and instituted as appropriate. Routine vaccinations against other bacterial and viral agents can be performed as indicated. Annual flu vaccination should be administered if indicated. ALLERGIES  Allergies   Allergen Reactions    Amoxicillin Other (comments)     \"Blood work related to the liver comes back elevated\"    Sulfa (Sulfonamide Antibiotics) Hives and Swelling       MEDICATIONS  Current Outpatient Medications   Medication Sig    ascorbic acid, vitamin C, (Vitamin C) 500 mg tablet Take 1,000 mg by mouth daily.  multivit-min/FA/lycopen/lutein (CENTRUM SILVER MEN PO) Take  by mouth. No current facility-administered medications for this visit. SYSTEM REVIEW NOT RELATED TO LIVER DISEASE OR REVIEWED ABOVE:  Constitution systems: Negative for fever, chills, weight gain, weight loss. Eyes: Negative for visual changes.   ENT: Negative for sore throat, painful swallowing. Respiratory: Negative for cough, hemoptysis, SOB. Cardiology: Negative for chest pain, palpitations. GI:  Negative for constipation or diarrhea. : Negative for urinary frequency, dysuria, hematuria, nocturia. Skin: Negative for rash. Hematology: Negative for easy bruising, blood clots. Musculo-skeletal: Negative for back pain, muscle pain, weakness. Neurologic: Negative for headaches, dizziness, vertigo, memory problems not related to HE. Resting tremor. Psychology: Negative for anxiety, depression. FAMILY HISTORY:  The father  of DM. The mother Has/had the following chronic disease(s): breast cancer. There is a grandfather with cirrhosis    SOCIAL HISTORY:  The patient is . The patient has 3 children,   The patient has never used tobacco products. The patient consumes alcohol on social occasions never in excess. The patient currently works full time as . PHYSICAL EXAMINATION:  Visit Vitals  /85 (BP 1 Location: Right upper arm, BP Patient Position: Sitting)   Pulse 90   Temp (!) 96.3 °F (35.7 °C) (Temporal)   Resp 14   Ht 6' 1\" (1.854 m)   Wt 195 lb (88.5 kg)   SpO2 98%   BMI 25.73 kg/m²       General: No acute distress. Eyes: Sclera anicteric. ENT: No oral lesions. Thyroid normal.  Nodes: No adenopathy. Skin: No spider angiomata. No jaundice. No palmar erythema. Respiratory: Lungs clear to auscultation. Cardiovascular: Regular heart rate. No murmurs. No JVD. Abdomen: Soft non-tender, liver size normal to percussion/palpation. Spleen not palpable. No obvious ascites. Extremities: No edema. No muscle wasting. No gross arthritic changes. Neurologic: Alert and oriented. Cranial nerves grossly intact. No asterixis. Tremor involving head movements observed.      LABORATORY STUDIES:  Liver [place] of [address] & Units 2021   WBC 4.1 - 11.1 K/uL 5.4    ANC 1.8 - 8.0 K/UL 3.1    HGB 12.1 - 17.0 g/dL 14.6     - 400 K/uL 208    INR 0.9 - 1.1       AST 15 - 37 U/L 28 21   ALT 12 - 78 U/L 56 48   Alk Phos 45 - 117 U/L 83 96   Bili, Total 0.2 - 1.0 MG/DL 0.5 0.4   Bili, Direct 0.0 - 0.2 MG/DL 0.1 0.1   Albumin 3.5 - 5.0 g/dL 3.9 3.9   BUN 6 - 20 MG/DL 13    Creat 0.70 - 1.30 MG/DL 1.09    Na 136 - 145 mmol/L 139    K 3.5 - 5.1 mmol/L 4.0    Cl 97 - 108 mmol/L 109 (H)    CO2 21 - 32 mmol/L 26    Glucose 65 - 100 mg/dL 113 (H)      Liver [facility] Latest Ref Rng & Units 3/11/2021   WBC 4.1 - 11.1 K/uL 6.0   ANC 1.8 - 8.0 K/UL 3.7   HGB 12.1 - 17.0 g/dL 14.3    - 400 K/uL 218   INR 0.9 - 1.1   1.0   AST 15 - 37 U/L 40 (H)   ALT 12 - 78 U/L 89 (H)   Alk Phos 45 - 117 U/L 108   Bili, Total 0.2 - 1.0 MG/DL 0.4   Bili, Direct 0.0 - 0.2 MG/DL 0.1   Albumin 3.5 - 5.0 g/dL 3.9   BUN 6 - 20 MG/DL 13   Creat 0.70 - 1.30 MG/DL 1.04   Na 136 - 145 mmol/L 140   K 3.5 - 5.1 mmol/L 4.2   Cl 97 - 108 mmol/L 109 (H)   CO2 21 - 32 mmol/L 24   Glucose 65 - 100 mg/dL 97     Laboratory testing from 4/13/2021 reviewed in detail. Additional testing included to evaluate progression or regression of disease. Laboratory testing results from today will be communicated by mail. SEROLOGIES:  2/2018. anti-HBcore negative, anti-HCV negative, Ferritin 348, iron saturation 39%, MOI negative, Anti-LKM negative, AMA negative, ceruloplsmin 31, alpha-1-antitrypsin 95, TSH 2.4    LIVER HISTOLOGY:  4/2021. FibroScan performed at [address]. EkPa was 4.6. . The results suggested a fibrosis level of F0. The CAP score suggests there is hepatic steatosis. ENDOSCOPIC PROCEDURES:  3/2018. Colonoscopy. RADIOLOGY:  5/2021. Ultrasound of liver. Echogenic consistent with fatty liver. No liver mass lesions. No dilated bile ducts. No ascites.     OTHER TESTING:  Not available or performed    FOLLOW-UP:  All of the issues listed above in the Assessment and Plan were discussed with the patient. All questions were answered. The patient expressed a clear understanding of the above. [facility] 87 in 6 months. April S.  JEEVAN [facility] of [address] [address] Ascencion Nugent, [address] [facility] 22.  [facility]

## 2021-09-27 NOTE — PROGRESS NOTES
Chief Complaint   Patient presents with    Follow-up     elevated liver enzymes      Visit Vitals  /85 (BP 1 Location: Right upper arm, BP Patient Position: Sitting)   Pulse 90   Temp (!) 96.3 °F (35.7 °C) (Temporal)   Resp 14   Ht 6' 1\" (1.854 m)   Wt 195 lb (88.5 kg)   SpO2 98%   BMI 25.73 kg/m²

## 2021-09-28 PROBLEM — K76.0 FATTY LIVER: Status: ACTIVE | Noted: 2021-03-11

## 2021-09-29 NOTE — PROGRESS NOTES
Letter sent to the patient regarding the blood work results. The ALT was slightly higher. Advised he work on dietary changes. We will see him back in 6 months.

## 2022-01-25 ENCOUNTER — OFFICE VISIT (OUTPATIENT)
Dept: NEUROLOGY | Age: 57
End: 2022-01-25
Payer: COMMERCIAL

## 2022-01-25 VITALS
DIASTOLIC BLOOD PRESSURE: 72 MMHG | SYSTOLIC BLOOD PRESSURE: 118 MMHG | WEIGHT: 195 LBS | HEART RATE: 97 BPM | HEIGHT: 73 IN | OXYGEN SATURATION: 98 % | BODY MASS INDEX: 25.84 KG/M2

## 2022-01-25 DIAGNOSIS — M79.674 GREAT TOE PAIN, RIGHT: ICD-10-CM

## 2022-01-25 DIAGNOSIS — G25.0 BENIGN HEAD TREMOR: Primary | ICD-10-CM

## 2022-01-25 PROCEDURE — 99214 OFFICE O/P EST MOD 30 MIN: CPT | Performed by: PSYCHIATRY & NEUROLOGY

## 2022-01-25 RX ORDER — OXYCODONE AND ACETAMINOPHEN 7.5; 325 MG/1; MG/1
TABLET ORAL
COMMUNITY
End: 2022-06-22

## 2022-01-25 RX ORDER — GABAPENTIN 300 MG/1
CAPSULE ORAL
COMMUNITY
End: 2022-06-22

## 2022-01-25 RX ORDER — BROMPHENIRAMINE MALEATE, PSEUDOEPHEDRINE HYDROCHLORIDE, AND DEXTROMETHORPHAN HYDROBROMIDE 2; 30; 10 MG/5ML; MG/5ML; MG/5ML
SYRUP ORAL
COMMUNITY

## 2022-01-25 RX ORDER — CYCLOBENZAPRINE HCL 10 MG
TABLET ORAL
COMMUNITY
End: 2022-06-22

## 2022-01-25 NOTE — PROGRESS NOTES
Chief Complaint   Patient presents with    Follow-up     Benign head tremor       HPI    Viridiana Rey is a 58-year-old gentleman following up on head tremor. Since I saw him last we completed an MRI of the brain with no unusual changes. Everything looks fine. He still has the head tremor which his daughter and wife noticed the most. On some days, he doesn't notice it if he is focused on his work. No hallucinations, drooling, falls, changes in walking. He does report some chronic right toe pain worse in cold weather, possibly old injury to that toe. Background:  Mr. Heather Kellogg is a 58-year-old gentleman with a history of right-sided schwannoma status post gamma knife with residual hearing loss who is here because of head shaking. He is not sure how long this has been going on exactly but he thinks it started after a Covid infection in fall 2020. His infection was severe enough he was in the hospital for a week on supplemental oxygen but not in the critical care unit. He is since recovered pulmonary wise from that issue but the tremor is getting worse such that his family is noticing it mainly in the evenings after he gets home. He also thinks it might be a little bit in the right hand. He spends all day working remotely from home and is very focused and he has not noticed it. No speech changes or vision changes. No loss of strength. No numbness. Symptoms do not interfere with daily activity. Nobody in the family he can think of with tremor. No difficulty swallowing. Review of Systems   Eyes: Negative for double vision. Gastrointestinal: Positive for constipation. Musculoskeletal: Positive for joint pain. Negative for falls. Neurological: Positive for tremors. Psychiatric/Behavioral: Negative for hallucinations. All other systems reviewed and are negative. No past medical history on file. No family history on file.   Social History     Socioeconomic History    Marital status:  Spouse name: Not on file    Number of children: Not on file    Years of education: Not on file    Highest education level: Not on file   Occupational History    Not on file   Tobacco Use    Smoking status: Former Smoker     Types: Cigarettes    Smokeless tobacco: Never Used   Vaping Use    Vaping Use: Never used   Substance and Sexual Activity    Alcohol use: Yes    Drug use: Not Currently    Sexual activity: Not on file   Other Topics Concern    Not on file   Social History Narrative    Not on file     Social Determinants of Health     Financial Resource Strain:     Difficulty of Paying Living Expenses: Not on file   Food Insecurity:     Worried About Running Out of Food in the Last Year: Not on file    Julián of Food in the Last Year: Not on file   Transportation Needs:     Lack of Transportation (Medical): Not on file    Lack of Transportation (Non-Medical):  Not on file   Physical Activity:     Days of Exercise per Week: Not on file    Minutes of Exercise per Session: Not on file   Stress:     Feeling of Stress : Not on file   Social Connections:     Frequency of Communication with Friends and Family: Not on file    Frequency of Social Gatherings with Friends and Family: Not on file    Attends [employer] Services: Not on file    Active Member of [address] [place] [address] [employer] or Organizations: Not on file    Attends Club or Organization Meetings: Not on file    Marital Status: Not on file   Intimate Partner Violence:     Fear of Current or Ex-Partner: Not on file    Emotionally Abused: Not on file    Physically Abused: Not on file    Sexually Abused: Not on file   Housing Stability:     Unable to Pay for Housing in the Last Year: Not on file    Number of [place] in the Last Year: Not on file    Unstable Housing in the Last Year: Not on file     Allergies   Allergen Reactions    Amoxicillin Other (comments)     \"Blood work related to the liver comes back elevated\"    Sulfa (Sulfonamide Antibiotics) Hives and Swelling         Current Outpatient Medications   Medication Sig    cyclobenzaprine (FLEXERIL) 10 mg tablet cyclobenzaprine 10 mg tablet    oxyCODONE-acetaminophen (PERCOCET 7.5) 7.5-325 mg per tablet oxycodone-acetaminophen 7.5 mg-325 mg tablet    ascorbic acid, vitamin C, (Vitamin C) 500 mg tablet Take 1,000 mg by mouth daily.  multivit-min/FA/lycopen/lutein (CENTRUM SILVER MEN PO) Take  by mouth.  brompheniramine-pseudoeph-DM (DIMETAPP) 2-30-10 mg/5 mL syrup brompheniramine-pseudoephedrine-DM 2 mg-30 mg-10 mg/5 mL oral syrup (Patient not taking: Reported on 1/25/2022)    gabapentin (NEURONTIN) 300 mg capsule gabapentin 300 mg capsule (Patient not taking: Reported on 1/25/2022)     No current facility-administered medications for this visit. Neurologic Exam     Mental Status   WD/WN adult in NAD, normal grooming  VSS  A&O x 3    PERRL, nonicteric  Face is symmetric, tongue midline  Speech is fluent and clear  No limb ataxia. No abnl movements. Moving all extemities spontaneously and symmetric  Finger-nose with a very slight kinetic tremor but accurate symmetric  Normal gait, not shuffling             Visit Vitals  /72   Pulse 97   Ht 6' 1\" (1.854 m)   Wt 195 lb (88.5 kg)   SpO2 98%   BMI 25.73 kg/m²       Assessment and Plan   Diagnoses and all orders for this visit:    1. Benign head tremor    2. Great toe pain, right  -     REFERRAL TO PODIATRY      61year-old gentleman whom I suspect is a benign head tremor. I suspect is a little bit of kinetic tremor in the hands slightly on exam today. I don't think this is Parkinson's disease. I think we should watch this condition over time. I would not start medication either since the tremor is not disabling and medications would likely cause a lot of side effects to develop. He may have bad days if he is tired or ill in which the tremor will be worse.  It is possible the tremor could get worse with age but that is not always a guarantee. I would like him to follow-up in about a year. Regarding the right toe pain, I'm giving him a referral to Dr. Jerilyn Couch in podiatry for an opinion. I don't think it has any relationship to the tremor. 30 minutes of time was taken in total today reviewing the medical record to include personal review of the neuroimaging, face-to-face time with examination and discussion, and time completing documentation today. I reviewed and decided to continue the current medications. This clinical note was dictated with an electronic dictation software that can make unintentional errors. If there are any questions, please contact me directly for clarification.       Damaso Stallworth, [address]  Diplomate ABPN

## 2022-01-25 NOTE — LETTER
1/25/2022    Patient: Lori Cabello   YOB: 1965   Date of Visit: 1/25/2022     [address] [id number]  Via Fax: [phone]    Dear Last Zaragoza MD,      Thank you for referring Mr. Lori Cabello to [address] [place] [address] for evaluation. My notes for this consultation are attached. If you have questions, please do not hesitate to call me. I look forward to following your patient along with you. Sincerely,    [address] [facility], DO    1/25/2022     Patient:  Lori Cabello   YOB: 1965  Date of Visit: 1/25/2022      Dear Last Zaragoza MD  [address] [place] [id number]  Via Fax: [phone]: I was requested by Damian Rivera MD to evaluate Mr. Lori Cabello  for   Chief Complaint   Patient presents with    Follow-up     Benign head tremor   . I am recommending the following:     Diagnoses and all orders for this visit:    1. Benign head tremor    2. Great toe pain, right  -     REFERRAL TO PODIATRY        ----------------------------------------------------------------------------------------------------------------------  Below is my encounter:     . Chief Complaint   Patient presents with    Follow-up     Benign head tremor       HPI    Hayley Grissom is a 48-year-old gentleman following up on head tremor. Since I saw him last we completed an MRI of the brain with no unusual changes. Everything looks fine. He still has the head tremor which his daughter and wife noticed the most. On some days, he doesn't notice it if he is focused on his work. No hallucinations, drooling, falls, changes in walking. He does report some chronic right toe pain worse in cold weather, possibly old injury to that toe.     Background:  Mr. Yetta Nipple is a 48-year-old gentleman with a history of right-sided schwannoma status post gamma knife with residual hearing loss who is here because of head shaking. He is not sure how long this has been going on exactly but he thinks it started after a Covid infection in fall 2020. His infection was severe enough he was in the hospital for a week on supplemental oxygen but not in the critical care unit. He is since recovered pulmonary wise from that issue but the tremor is getting worse such that his family is noticing it mainly in the evenings after he gets home. He also thinks it might be a little bit in the right hand. He spends all day working remotely from home and is very focused and he has not noticed it. No speech changes or vision changes. No loss of strength. No numbness. Symptoms do not interfere with daily activity. Nobody in the family he can think of with tremor. No difficulty swallowing. Review of Systems   Eyes: Negative for double vision. Gastrointestinal: Positive for constipation. Musculoskeletal: Positive for joint pain. Negative for falls. Neurological: Positive for tremors. Psychiatric/Behavioral: Negative for hallucinations. All other systems reviewed and are negative. No past medical history on file. No family history on file. Social History     Socioeconomic History    Marital status:      Spouse name: Not on file    Number of children: Not on file    Years of education: Not on file    Highest education level: Not on file   Occupational History    Not on file   Tobacco Use    Smoking status: Former Smoker     Types: Cigarettes    Smokeless tobacco: Never Used   Vaping Use    Vaping Use: Never used   Substance and Sexual Activity    Alcohol use:  Yes    Drug use: Not Currently    Sexual activity: Not on file   Other Topics Concern    Not on file   Social History Narrative    Not on file     Social Determinants of Health     Financial Resource Strain:     Difficulty of Paying Living Expenses: Not on file   Food Insecurity:     Worried About [address] in the Last Year: Not on file    Ran Out of Food in the Last Year: Not on file   Transportation Needs:     Lack of Transportation (Medical): Not on file    Lack of Transportation (Non-Medical): Not on file   Physical Activity:     Days of Exercise per Week: Not on file    Minutes of Exercise per Session: Not on file   Stress:     Feeling of Stress : Not on file   Social Connections:     Frequency of Communication with Friends and Family: Not on file    Frequency of Social Gatherings with Friends and Family: Not on file    Attends Latter-day Services: Not on file    Active Member of [address] [place] [address] [employer] or Organizations: Not on file    Attends Club or Organization Meetings: Not on file    Marital Status: Not on file   Intimate Partner Violence:     Fear of Current or Ex-Partner: Not on file    Emotionally Abused: Not on file    Physically Abused: Not on file    Sexually Abused: Not on file   Housing Stability:     Unable to Pay for Housing in the Last Year: Not on file    Number of [place] in the Last Year: Not on file    Unstable Housing in the Last Year: Not on file     Allergies   Allergen Reactions    Amoxicillin Other (comments)     \"Blood work related to the liver comes back elevated\"    Sulfa (Sulfonamide Antibiotics) Hives and Swelling         Current Outpatient Medications   Medication Sig    cyclobenzaprine (FLEXERIL) 10 mg tablet cyclobenzaprine 10 mg tablet    oxyCODONE-acetaminophen (PERCOCET 7.5) 7.5-325 mg per tablet oxycodone-acetaminophen 7.5 mg-325 mg tablet    ascorbic acid, vitamin C, (Vitamin C) 500 mg tablet Take 1,000 mg by mouth daily.  multivit-min/FA/lycopen/lutein (CENTRUM SILVER MEN PO) Take  by mouth.     brompheniramine-pseudoeph-DM (DIMETAPP) 2-30-10 mg/5 mL syrup brompheniramine-pseudoephedrine-DM 2 mg-30 mg-10 mg/5 mL oral syrup (Patient not taking: Reported on 1/25/2022)    gabapentin (NEURONTIN) 300 mg capsule gabapentin 300 mg capsule (Patient not taking: Reported on 1/25/2022) No current facility-administered medications for this visit. Neurologic Exam     Mental Status   WD/WN adult in NAD, normal grooming  VSS  A&O x 3    PERRL, nonicteric  Face is symmetric, tongue midline  Speech is fluent and clear  No limb ataxia. No abnl movements. Moving all extemities spontaneously and symmetric  Finger-nose with a very slight kinetic tremor but accurate symmetric  Normal gait, not shuffling             Visit Vitals  /72   Pulse 97   Ht 6' 1\" (1.854 m)   Wt 195 lb (88.5 kg)   SpO2 98%   BMI 25.73 kg/m²       Assessment and Plan   Diagnoses and all orders for this visit:    1. Benign head tremor    2. Great toe pain, right  -     REFERRAL TO PODIATRY      61year-old gentleman whom I suspect is a benign head tremor. I suspect is a little bit of kinetic tremor in the hands slightly on exam today. I don't think this is Parkinson's disease. I think we should watch this condition over time. I would not start medication either since the tremor is not disabling and medications would likely cause a lot of side effects to develop. He may have bad days if he is tired or ill in which the tremor will be worse. It is possible the tremor could get worse with age but that is not always a guarantee. I would like him to follow-up in about a year. Regarding the right toe pain, I'm giving him a referral to Dr. Paola Bennett in podiatry for an opinion. I don't think it has any relationship to the tremor. 30 minutes of time was taken in total today reviewing the medical record to include personal review of the neuroimaging, face-to-face time with examination and discussion, and time completing documentation today. I reviewed and decided to continue the current medications. This clinical note was dictated with an electronic dictation software that can make unintentional errors. If there are any questions, please contact me directly for clarification.       [facility], DO  NEUROLOGIST  Diplomate ABPN      Thank you for giving me the opportunity to assist in the care of Mr. Rena Amin. If you have questions, please do not hesitate to contact me.         Sincerely,      Tariq Blandon DO  Neurologist  Brain Injury Medicine  Diplomate ABPN

## 2022-01-25 NOTE — PROGRESS NOTES
Fran Torres is a 61 y.o. male  HIPAA verified by two patient identifiers. Health Maintenance Due   Topic    Hepatitis C Screening     COVID-19 Vaccine (1)    DTaP/Tdap/Td series (1 - Tdap)    Lipid Screen     Colorectal Cancer Screening Combo     Shingrix Vaccine Age 50> (1 of 2)    Flu Vaccine (1)     Chief Complaint   Patient presents with    Follow-up     Benign head tremor     Visit Vitals  /72   Pulse 97   Ht 6' 1\" (1.854 m)   Wt 195 lb (88.5 kg)   SpO2 98%   BMI 25.73 kg/m²       Pain Scale: 0 - No pain/10  Pain Location:   1. Have you been to the ER, urgent care clinic since your last visit? Hospitalized since your last visit? No    2. Have you seen or consulted any other health care providers outside of the [address] [place] [address] since your last visit? Include any pap smears or colon screening.  No

## 2022-03-18 PROBLEM — K76.0 FATTY LIVER: Status: ACTIVE | Noted: 2021-03-11

## 2022-03-19 PROBLEM — Z86.16 HISTORY OF COVID-19: Status: ACTIVE | Noted: 2021-03-11

## 2022-03-19 PROBLEM — R05.3 CHRONIC COUGH: Status: ACTIVE | Noted: 2021-03-11

## 2022-03-20 PROBLEM — Z98.890 H/O SINUS SURGERY: Status: ACTIVE | Noted: 2021-03-11

## 2022-04-25 ENCOUNTER — TELEPHONE (OUTPATIENT)
Dept: NEUROLOGY | Age: 57
End: 2022-04-25

## 2022-04-25 NOTE — TELEPHONE ENCOUNTER
Spoke with patient, Ron Moreno is referring him for an EMG. He will fax order over now.  Informed him once I had a copy I would get him schedule for testing

## 2022-06-22 ENCOUNTER — OFFICE VISIT (OUTPATIENT)
Dept: HEMATOLOGY | Age: 57
End: 2022-06-22
Payer: COMMERCIAL

## 2022-06-22 VITALS
SYSTOLIC BLOOD PRESSURE: 132 MMHG | HEART RATE: 71 BPM | WEIGHT: 197.4 LBS | OXYGEN SATURATION: 81 % | BODY MASS INDEX: 26.16 KG/M2 | DIASTOLIC BLOOD PRESSURE: 96 MMHG | HEIGHT: 73 IN | TEMPERATURE: 97.5 F

## 2022-06-22 DIAGNOSIS — K76.0 FATTY LIVER: Primary | ICD-10-CM

## 2022-06-22 LAB
ALBUMIN SERPL-MCNC: 4 G/DL (ref 3.5–5)
ALBUMIN/GLOB SERPL: 1.1 {RATIO} (ref 1.1–2.2)
ALP SERPL-CCNC: 93 U/L (ref 45–117)
ALT SERPL-CCNC: 67 U/L (ref 12–78)
ANION GAP SERPL CALC-SCNC: 5 MMOL/L (ref 5–15)
AST SERPL-CCNC: 29 U/L (ref 15–37)
BASOPHILS # BLD: 0.1 K/UL (ref 0–0.1)
BASOPHILS NFR BLD: 1 % (ref 0–1)
BILIRUB DIRECT SERPL-MCNC: 0.1 MG/DL (ref 0–0.2)
BILIRUB SERPL-MCNC: 0.4 MG/DL (ref 0.2–1)
BUN SERPL-MCNC: 12 MG/DL (ref 6–20)
BUN/CREAT SERPL: 11 (ref 12–20)
CALCIUM SERPL-MCNC: 9.6 MG/DL (ref 8.5–10.1)
CHLORIDE SERPL-SCNC: 106 MMOL/L (ref 97–108)
CO2 SERPL-SCNC: 28 MMOL/L (ref 21–32)
CREAT SERPL-MCNC: 1.06 MG/DL (ref 0.7–1.3)
DIFFERENTIAL METHOD BLD: NORMAL
EOSINOPHIL # BLD: 0.1 K/UL (ref 0–0.4)
EOSINOPHIL NFR BLD: 2 % (ref 0–7)
ERYTHROCYTE [DISTWIDTH] IN BLOOD BY AUTOMATED COUNT: 14.2 % (ref 11.5–14.5)
GLOBULIN SER CALC-MCNC: 3.6 G/DL (ref 2–4)
GLUCOSE SERPL-MCNC: 106 MG/DL (ref 65–100)
HCT VFR BLD AUTO: 44.9 % (ref 36.6–50.3)
HGB BLD-MCNC: 14.4 G/DL (ref 12.1–17)
IMM GRANULOCYTES # BLD AUTO: 0 K/UL (ref 0–0.04)
IMM GRANULOCYTES NFR BLD AUTO: 0 % (ref 0–0.5)
LYMPHOCYTES # BLD: 2.4 K/UL (ref 0.8–3.5)
LYMPHOCYTES NFR BLD: 41 % (ref 12–49)
MCH RBC QN AUTO: 27 PG (ref 26–34)
MCHC RBC AUTO-ENTMCNC: 32.1 G/DL (ref 30–36.5)
MCV RBC AUTO: 84.1 FL (ref 80–99)
MONOCYTES # BLD: 0.4 K/UL (ref 0–1)
MONOCYTES NFR BLD: 8 % (ref 5–13)
NEUTS SEG # BLD: 2.8 K/UL (ref 1.8–8)
NEUTS SEG NFR BLD: 48 % (ref 32–75)
NRBC # BLD: 0 K/UL (ref 0–0.01)
NRBC BLD-RTO: 0 PER 100 WBC
PLATELET # BLD AUTO: 243 K/UL (ref 150–400)
PMV BLD AUTO: 9.8 FL (ref 8.9–12.9)
POTASSIUM SERPL-SCNC: 4 MMOL/L (ref 3.5–5.1)
PROT SERPL-MCNC: 7.6 G/DL (ref 6.4–8.2)
RBC # BLD AUTO: 5.34 M/UL (ref 4.1–5.7)
SODIUM SERPL-SCNC: 139 MMOL/L (ref 136–145)
WBC # BLD AUTO: 5.8 K/UL (ref 4.1–11.1)

## 2022-06-22 PROCEDURE — 99213 OFFICE O/P EST LOW 20 MIN: CPT | Performed by: NURSE PRACTITIONER

## 2022-06-22 RX ORDER — NABUMETONE 500 MG/1
1 TABLET, FILM COATED ORAL 2 TIMES DAILY
COMMUNITY
Start: 2022-06-16

## 2022-06-22 NOTE — PROGRESS NOTES
Identified pt with two pt identifiers(name and ). Reviewed record in preparation for visit and have obtained necessary documentation. Chief Complaint   Patient presents with    Fatty Liver     6month f/u with April      Vitals:    22 1335   BP: (!) 132/96   Pulse: 71   Temp: 97.5 °F (36.4 °C)   TempSrc: Temporal   SpO2: (!) 81%   Weight: 197 lb 6.4 oz (89.5 kg)   Height: 6' 1\" (1.854 m)   PainSc:   0 - No pain       Health Maintenance Review: Patient reminded of \"due or due soon\" health maintenance. I have asked the patient to contact his/her primary care provider (PCP) for follow-up on his/her health maintenance. Coordination of Care Questionnaire:  :   1) Have you been to an emergency room, urgent care, or hospitalized since your last visit? If yes, where when, and reason for visit? no       2. Have seen or consulted any other health care provider since your last visit? If yes, where when, and reason for visit? YES. Ortho       Patient is accompanied by self I have received verbal consent from Jannice Duane to discuss any/all medical information while they are present in the room.

## 2022-06-22 NOTE — PROGRESS NOTES
3340 [facility], [place], [address], [facility], [place]      LARA Santana Staff, [facility]     Shonna Manzanares, [facility]   Luciano Mathew, P-ELIZABETH Quarles, [facility]       Georgie Feliz De Castro 136    at [address], [facility] Juliana Solitario  22.    [phone]    FAX: [address] [place] [address], [address]    [phone]    FAX: [phone]         Patient Care Team:  Mitchell Aragon MD as PCP - General (Internal Medicine Physician)      Problem List  Date Reviewed: 9/28/2021          Codes Class Noted    Fatty liver ICD-10-CM: K76.0  ICD-9-CM: 571.8  3/11/2021        Chronic cough ICD-10-CM: R05.3  ICD-9-CM: 786.2  3/11/2021        History of COVID-19 ICD-10-CM: U50.37  ICD-9-CM: V12.09  3/11/2021        H/O sinus surgery ICD-10-CM: Z98.890  ICD-9-CM: V45.89  3/11/2021              Kelsy Quinonez is being seen at [address] for management of elevated liver enzymes that is suspected to be from fatty liver. The active problem list, all pertinent past medical history, medications, radiologic findings and laboratory findings related to the liver disorder were reviewed and discussed with the patient. The patient is a 61 y.o.  male who was found to have elevated liver transaminases in 2018. The patient was on Augmentin and the elevation was thought to be DILI. The liver enzymes remained elevated after discontinuation of the medication. Serologic evaluation for markers of chronic liver disease were negative. The patient was diagnosed with COVID 9/2020, liver enzymes elevated further but returned to baseline elevation after resolution.      Assessment of liver fibrosis was performed with Fibroscan 4/2021. The result was 4.6 kPa which correlates with no fibrosis. The CAP score of 360 suggests hepatic steatosis. Since the last office visit the patient notes chronic fatigue since having Covid. He has not made dietary changes but has increased water intake. Relafen was started 5/16/2022 for arthritic pain. The patient has the following symptoms which are thought to be due to the liver disease: Fatigue. The patient is not currently experiencing the following symptoms of liver disease:  pain in the right side over the liver, itching, or abdominal swelling. The patient completes all daily activities without any functional limitations. ASSESSMENT AND PLAN:  Fatty liver  The diagnosis is based upon imaging, and Fiboscan CAP score. Assessment of liver fibrosis was performed with Fibroscan 4/2021. The result was 4.6 kPa which correlates with no fibrosis. The CAP score of 360 suggests hepatic steatosis. Imaging with ultrasound was performed 5/2021. The result suggests changes consistent with steatosis. Have performed laboratory testing to monitor liver function and degree of liver injury. This included BMP, hepatic panel, and CBC with platelet count. Laboratory testing from 9/27/2021 reviewed in detail. Follow-up testing ordered today. Have performed laboratory testing to monitor liver function and degree of liver injury. This included BMP, hepatic panel, and CBC with platelet count. Laboratory testing from 4/13/2021 reviewed in detail. Follow-up testing ordered today. The AST is normal. The ALT is elevated above 40. The ALP, liver function and platelet count are normal.     The ASMA is positive which could be falsely elevated due to inflammation or he has an underlying autoimmune liver disease. The patient does not have a significant amount of weight to lose. Recommend decreasing portion sizes of bread, pasta, potatoes, and sweets. Reinforced dietary changes.      If the liver enzymes do not improve with dietary changes and the Fibroscan liver stiffness score increases a liver biopsy may be needed to determine the underlying cause of liver disease. The Fibroscan can be repeated annually or as often as clinically indicated to assess for fibrosis progression and/or regression. This will be done at the next office visit. Counseling for diet and weight loss in patients with confirmed or suspected NAFLD  The patient was counseled regarding diet and exercise to achieve weight loss. The best diet for patients with fatty liver is one very low in carbohydrates and enriched with protein. The patient was told not to consume any food products and drinks containing fructose as this enhances hepatic fat synthesis    Screening for Hepatocellular Carcinoma  HCC screening is not necessary if the patient has no evidence of cirrhosis. Treatment of other medical problems in patients with chronic liver disease  There are no contraindications for the patient to take most medications that are necessary for treatment of other medical issues. Counseling for alcohol in patients with chronic liver disease  The patient was counseled regarding alcohol consumption and the effect of alcohol on chronic liver disease. The patient does not consume any significant amount of alcohol. Vaccinations   Routine vaccinations against other bacterial and viral agents can be performed as indicated. Annual flu vaccination should be administered if indicated. ALLERGIES  Allergies   Allergen Reactions    Amoxicillin Other (comments)     \"Blood work related to the liver comes back elevated\"    Sulfa (Sulfonamide Antibiotics) Hives and Swelling       MEDICATIONS  Current Outpatient Medications   Medication Sig    nabumetone (RELAFEN) 500 mg tablet Take 1 Tablet by mouth two (2) times a day.     brompheniramine-pseudoeph-DM (DIMETAPP) 2-30-10 mg/5 mL syrup brompheniramine-pseudoephedrine-DM 2 mg-30 mg-10 mg/5 mL oral syrup    ascorbic acid, vitamin C, (Vitamin C) 500 mg tablet Take 1,000 mg by mouth daily.  multivit-min/FA/lycopen/lutein (CENTRUM SILVER MEN PO) Take  by mouth. No current facility-administered medications for this visit. SYSTEM REVIEW NOT RELATED TO LIVER DISEASE OR REVIEWED ABOVE:  Constitution systems: Negative for fever, chills, weight gain, weight loss. Eyes: Negative for visual changes. ENT: Negative for sore throat, painful swallowing. Respiratory: Negative for cough, hemoptysis, SOB. Cardiology: Negative for chest pain, palpitations. GI:  Negative for constipation or diarrhea. : Negative for urinary frequency, dysuria, hematuria, nocturia. Skin: Negative for rash. Hematology: Negative for easy bruising, blood clots. Musculo-skeletal: Negative for back pain, muscle pain, weakness. Neurologic: Negative for headaches, dizziness, vertigo, memory problems not related to HE. Resting tremor. Psychology: Negative for anxiety, depression. FAMILY HISTORY:  The father  of DM. The mother Has/had the following chronic disease(s): breast cancer. There is a grandfather with cirrhosis    SOCIAL HISTORY:  The patient is . The patient has 3 children,   The patient has never used tobacco products. The patient consumes alcohol on social occasions never in excess. The patient currently works full time as . PHYSICAL EXAMINATION:  Visit Vitals  BP (!) 132/96 (BP 1 Location: Right arm, BP Patient Position: Sitting, BP Cuff Size: Adult long)   Pulse 71   Temp 97.5 °F (36.4 °C) (Temporal)   Ht 6' 1\" (1.854 m)   Wt 197 lb 6.4 oz (89.5 kg)   SpO2 (!) 81%   BMI 26.04 kg/m²       General: No acute distress. Eyes: Sclera anicteric. ENT: No oral lesions. Thyroid normal.  Nodes: No adenopathy. Skin: No spider angiomata. No jaundice. No palmar erythema. Respiratory: Lungs clear to auscultation. Cardiovascular: Regular heart rate. No murmurs. No JVD. Abdomen: Soft non-tender, liver size normal to percussion/palpation. Spleen not palpable. No obvious ascites. Extremities: No edema. No muscle wasting. No gross arthritic changes. Neurologic: Alert and oriented. Cranial nerves grossly intact. No asterixis. LABORATORY STUDIES:  Liver [place] of [address] [place] [address] & Units 6/22/2022 9/27/2021   WBC 4.1 - 11.1 K/uL 5.8 5.4   ANC 1.8 - 8.0 K/UL 2.8 3.1   HGB 12.1 - 17.0 g/dL 14.4 14.6    - 400 K/uL 243 208   INR 0.9 - 1.1       AST 15 - 37 U/L 29 28   ALT 12 - 78 U/L 67 56   Alk Phos 45 - 117 U/L 93 83   Bili, Total 0.2 - 1.0 MG/DL 0.4 0.5   Bili, Direct 0.0 - 0.2 MG/DL 0.1 0.1   Albumin 3.5 - 5.0 g/dL 4.0 3.9   BUN 6 - 20 MG/DL 12 13   Creat 0.70 - 1.30 MG/DL 1.06 1.09   Na 136 - 145 mmol/L 139 139   K 3.5 - 5.1 mmol/L 4.0 4.0   Cl 97 - 108 mmol/L 106 109 (H)   CO2 21 - 32 mmol/L 28 26   Glucose 65 - 100 mg/dL 106 (H) 113 (H)     Laboratory testing from 9/27/2021 reviewed in detail. Additional testing included to evaluate progression or regression of disease. Laboratory testing results from today will be communicated by My Chart. SEROLOGIES:  2/2018. anti-HBcore negative, anti-HCV negative, Ferritin 348, iron saturation 39%, MOI negative, Anti-LKM negative, AMA negative, ceruloplsmin 31, alpha-1-antitrypsin 95, TSH 2.4    LIVER HISTOLOGY:  4/2021. [facility]. EkPa was 4.6. . The results suggested a fibrosis level of F0. The CAP score suggests there is hepatic steatosis. ENDOSCOPIC PROCEDURES:  3/2018. Colonoscopy. RADIOLOGY:  5/2021. Ultrasound of liver. Echogenic consistent with fatty liver. No liver mass lesions. No dilated bile ducts. No ascites. OTHER TESTING:  Not available or performed    FOLLOW-UP:  All of the issues listed above in the Assessment and Plan were discussed with the patient. All questions were answered.   The patient expressed a clear understanding of the above. [address] [facility] 87 in 4 months for a Fibroscan. April S.  [place], [facility] of [address] [address] [address] Nugent, 2000 [facility] 22.  201 [facility]

## 2022-06-24 NOTE — PROGRESS NOTES
Letter sent to the patient regarding the blood work results. The ALT was slightly higher as compared to previous testing.  All other labs were normal.

## 2022-12-16 ENCOUNTER — OFFICE VISIT (OUTPATIENT)
Dept: HEMATOLOGY | Age: 57
End: 2022-12-16
Payer: COMMERCIAL

## 2022-12-16 VITALS
DIASTOLIC BLOOD PRESSURE: 76 MMHG | HEIGHT: 73 IN | BODY MASS INDEX: 25.84 KG/M2 | SYSTOLIC BLOOD PRESSURE: 105 MMHG | WEIGHT: 195 LBS | HEART RATE: 61 BPM | TEMPERATURE: 98.2 F | RESPIRATION RATE: 18 BRPM | OXYGEN SATURATION: 98 %

## 2022-12-16 DIAGNOSIS — K76.0 FATTY LIVER: Primary | ICD-10-CM

## 2022-12-16 LAB
ALBUMIN SERPL-MCNC: 4.1 G/DL (ref 3.5–5)
ALBUMIN/GLOB SERPL: 1.3 {RATIO} (ref 1.1–2.2)
ALP SERPL-CCNC: 86 U/L (ref 45–117)
ALT SERPL-CCNC: 50 U/L (ref 12–78)
ANION GAP SERPL CALC-SCNC: 4 MMOL/L (ref 5–15)
AST SERPL-CCNC: 22 U/L (ref 15–37)
BASOPHILS # BLD: 0.1 K/UL (ref 0–0.1)
BASOPHILS NFR BLD: 1 % (ref 0–1)
BILIRUB DIRECT SERPL-MCNC: 0.1 MG/DL (ref 0–0.2)
BILIRUB SERPL-MCNC: 0.5 MG/DL (ref 0.2–1)
BUN SERPL-MCNC: 10 MG/DL (ref 6–20)
BUN/CREAT SERPL: 11 (ref 12–20)
CALCIUM SERPL-MCNC: 9 MG/DL (ref 8.5–10.1)
CHLORIDE SERPL-SCNC: 110 MMOL/L (ref 97–108)
CO2 SERPL-SCNC: 28 MMOL/L (ref 21–32)
CREAT SERPL-MCNC: 0.95 MG/DL (ref 0.7–1.3)
DIFFERENTIAL METHOD BLD: NORMAL
EOSINOPHIL # BLD: 0.1 K/UL (ref 0–0.4)
EOSINOPHIL NFR BLD: 2 % (ref 0–7)
ERYTHROCYTE [DISTWIDTH] IN BLOOD BY AUTOMATED COUNT: 13.4 % (ref 11.5–14.5)
GLOBULIN SER CALC-MCNC: 3.2 G/DL (ref 2–4)
GLUCOSE SERPL-MCNC: 89 MG/DL (ref 65–100)
HCT VFR BLD AUTO: 46 % (ref 36.6–50.3)
HGB BLD-MCNC: 14.6 G/DL (ref 12.1–17)
IMM GRANULOCYTES # BLD AUTO: 0 K/UL (ref 0–0.04)
IMM GRANULOCYTES NFR BLD AUTO: 0 % (ref 0–0.5)
LYMPHOCYTES # BLD: 1.8 K/UL (ref 0.8–3.5)
LYMPHOCYTES NFR BLD: 37 % (ref 12–49)
MCH RBC QN AUTO: 27 PG (ref 26–34)
MCHC RBC AUTO-ENTMCNC: 31.7 G/DL (ref 30–36.5)
MCV RBC AUTO: 85 FL (ref 80–99)
MONOCYTES # BLD: 0.3 K/UL (ref 0–1)
MONOCYTES NFR BLD: 7 % (ref 5–13)
NEUTS SEG # BLD: 2.6 K/UL (ref 1.8–8)
NEUTS SEG NFR BLD: 53 % (ref 32–75)
NRBC # BLD: 0 K/UL (ref 0–0.01)
NRBC BLD-RTO: 0 PER 100 WBC
PLATELET # BLD AUTO: 245 K/UL (ref 150–400)
PMV BLD AUTO: 9.5 FL (ref 8.9–12.9)
POTASSIUM SERPL-SCNC: 4.6 MMOL/L (ref 3.5–5.1)
PROT SERPL-MCNC: 7.3 G/DL (ref 6.4–8.2)
RBC # BLD AUTO: 5.41 M/UL (ref 4.1–5.7)
SODIUM SERPL-SCNC: 142 MMOL/L (ref 136–145)
WBC # BLD AUTO: 4.9 K/UL (ref 4.1–11.1)

## 2022-12-16 NOTE — PROGRESS NOTES
3340 [facility], [place], Kandice Martinez, Javon Migel Jimenez, [place]      Henry Flores, LARA Fox, [facility]     Shonna HARPER Glenna, [facility]   Claudia Lopez P-ELIZABETH Aguirre, [facility]       Georgie Bautista [facility] [address] 136    at [address], [address] Juliana Solitario  22.    [phone]    FAX: [address] [place] [address], [address]    [phone]    FAX: [phone]       Patient Care Team:  Joshua Batista MD as PCP - General (Internal Medicine Physician)      Problem List  Date Reviewed: 12/16/2022            Codes Class Noted    Fatty liver ICD-10-CM: K76.0  ICD-9-CM: 571.8  3/11/2021        Chronic cough ICD-10-CM: R05.3  ICD-9-CM: 786.2  3/11/2021        History of COVID-19 ICD-10-CM: J11.84  ICD-9-CM: V12.09  3/11/2021        H/O sinus surgery ICD-10-CM: Z98.890  ICD-9-CM: V45.89  3/11/2021           Rena Amin is being seen at [facility] for management of elevated liver enzymes that is suspected to be from fatty liver. The active problem list, all pertinent past medical history, medications, radiologic findings and laboratory findings related to the liver disorder were reviewed and discussed with the patient. The patient is a 61 y.o.  male who was found to have elevated liver transaminases in 2018. The patient was on Augmentin and the elevation was thought to be DILI. The liver enzymes remained elevated after discontinuation of the medication. Serologic evaluation for markers of chronic liver disease were negative. The patient was diagnosed with COVID 9/2020, liver enzymes elevated further but returned to baseline elevation after resolution.      Assessment of liver fibrosis was performed with Fibroscan 4/2021. The result was 4.6 kPa which correlates with no fibrosis. The CAP score of 360 suggests hepatic steatosis. He returns for Fibroscan today. Since the last office visit the patient has had no new complications of liver disease. He has a right inguinal hernia which is scheduled for repair 1/2023. The patient has the following symptoms which are thought to be due to the liver disease: Fatigue. The patient is not currently experiencing the following symptoms of liver disease:  pain in the right side over the liver, itching, or abdominal swelling. The patient completes all daily activities without any functional limitations. ASSESSMENT AND PLAN:  Fatty liver  The diagnosis is based upon imaging, and Fiboscan CAP score. Assessment of liver fibrosis was performed with Fibroscan 4/2021. The result was 4.6 kPa which correlates with no fibrosis. The CAP score of 360 suggests hepatic steatosis. Assessment of liver fibrosis was performed with Fibroscan in the office today. The result was 4.4 kPa which correlates with no fibrosis. The CAP score of 342 suggests hepatic steatosis. Imaging with ultrasound was performed 5/2021. The result suggests changes consistent with steatosis. Have performed laboratory testing to monitor liver function and degree of liver injury. This included BMP, hepatic panel, and CBC with platelet count. Laboratory testing from 6/22/2022 reviewed in detail. Follow-up testing ordered today. The AST is normal. The ALT is elevated. The ALP is normal. The liver function and platelet count are normal.     The AST is normal. The ALT is elevated above 40. The ALP, liver function and platelet count are normal.     The ASMA is positive which could be falsely elevated due to inflammation or he has an underlying autoimmune liver disease. The patient does not have a significant amount of weight to lose.  Recommend decreasing portion sizes of bread, pasta, potatoes, and sweets. Reinforced dietary changes. If the liver enzymes do not improve with dietary changes and the Fibroscan liver stiffness score increases a liver biopsy may be needed to determine the underlying cause of liver disease. The Fibroscan can be repeated annually or as often as clinically indicated to assess for fibrosis progression and/or regression. This will be done at the next office visit. Counseling for diet and weight loss in patients with confirmed or suspected NAFLD  The patient was counseled regarding diet and exercise to achieve weight loss. The best diet for patients with fatty liver is one very low in carbohydrates and enriched with protein. The patient was told not to consume any food products and drinks containing fructose as this enhances hepatic fat synthesis    Screening for Hepatocellular Carcinoma  HCC screening is not necessary if the patient has no evidence of cirrhosis. Treatment of other medical problems in patients with chronic liver disease  There are no contraindications for the patient to take most medications that are necessary for treatment of other medical issues. Counseling for alcohol in patients with chronic liver disease  The patient was counseled regarding alcohol consumption and the effect of alcohol on chronic liver disease. The patient does not consume any significant amount of alcohol. Vaccinations   Routine vaccinations against other bacterial and viral agents can be performed as indicated. Annual flu vaccination should be administered if indicated. ALLERGIES  Allergies   Allergen Reactions    Amoxicillin Other (comments)     \"Blood work related to the liver comes back elevated\"    Sulfa (Sulfonamide Antibiotics) Hives and Swelling       MEDICATIONS  Current Outpatient Medications   Medication Sig    ascorbic acid, vitamin C, (VITAMIN C) 500 mg tablet Take 1,000 mg by mouth daily.     multivit-min/FA/lycopen/lutein (CENTRUM SILVER MEN PO) Take by mouth. No current facility-administered medications for this visit. SYSTEM REVIEW NOT RELATED TO LIVER DISEASE OR REVIEWED ABOVE:  Constitution systems: Negative for fever, chills, weight gain, weight loss. Eyes: Negative for visual changes. ENT: Negative for sore throat, painful swallowing. Respiratory: Negative for cough, hemoptysis, SOB. Cardiology: Negative for chest pain, palpitations. GI:  Negative for constipation or diarrhea. : Negative for urinary frequency, dysuria, hematuria, nocturia. Skin: Negative for rash. Hematology: Negative for easy bruising, blood clots. Musculo-skeletal: Negative for back pain, muscle pain, weakness. Neurologic: Negative for headaches, dizziness, vertigo, memory problems not related to HE. Resting tremor. Psychology: Negative for anxiety, depression. FAMILY HISTORY:  The father  of DM. The mother Has/had the following chronic disease(s): breast cancer. There is a grandfather with cirrhosis    SOCIAL HISTORY:  The patient is . The patient has 3 children,   The patient has never used tobacco products. The patient consumes alcohol on social occasions never in excess. The patient currently works full time as . PHYSICAL EXAMINATION:  Visit Vitals  /76 (BP 1 Location: Right upper arm, BP Patient Position: Sitting, BP Cuff Size: Adult)   Pulse 61   Temp 98.2 °F (36.8 °C) (Temporal)   Resp 18   Ht 6' 1\" (1.854 m)   Wt 195 lb (88.5 kg)   SpO2 98%   BMI 25.73 kg/m²       General: No acute distress. Eyes: Sclera anicteric. ENT: No oral lesions. Thyroid normal.  Nodes: No adenopathy. Skin: No spider angiomata. No jaundice. No palmar erythema. Respiratory: Lungs clear to auscultation. Cardiovascular: Regular heart rate. No murmurs. No JVD. Abdomen: Soft non-tender, liver size normal to percussion/palpation. Spleen not palpable. No obvious ascites. Extremities: No edema.   No muscle wasting. No gross arthritic changes. Neurologic: Alert and oriented. Cranial nerves grossly intact. No asterixis. LABORATORY STUDIES:  Liver [place] of [address] & Units 6/22/2022 9/27/2021   WBC 4.1 - 11.1 K/uL 5.8 5.4   ANC 1.8 - 8.0 K/UL 2.8 3.1   HGB 12.1 - 17.0 g/dL 14.4 14.6    - 400 K/uL 243 208   INR 0.9 - 1.1       AST 15 - 37 U/L 29 28   ALT 12 - 78 U/L 67 56   Alk Phos 45 - 117 U/L 93 83   Bili, Total 0.2 - 1.0 MG/DL 0.4 0.5   Bili, Direct 0.0 - 0.2 MG/DL 0.1 0.1   Albumin 3.5 - 5.0 g/dL 4.0 3.9   BUN 6 - 20 MG/DL 12 13   Creat 0.70 - 1.30 MG/DL 1.06 1.09   Na 136 - 145 mmol/L 139 139   K 3.5 - 5.1 mmol/L 4.0 4.0   Cl 97 - 108 mmol/L 106 109 (H)   CO2 21 - 32 mmol/L 28 26   Glucose 65 - 100 mg/dL 106 (H) 113 (H)     Laboratory testing from 6/22/2022 reviewed in detail. Additional testing included to evaluate progression or regression of disease. Laboratory testing results from today will be communicated by mail. SEROLOGIES:  2/2018. anti-HBcore negative, anti-HCV negative, Ferritin 348, iron saturation 39%, MOI negative, Anti-LKM negative, AMA negative, ceruloplsmin 31, alpha-1-antitrypsin 95, TSH 2.4    LIVER HISTOLOGY:  4/2021. FibroScan performed at [facility]. EkPa was 4.6. . The results suggested a fibrosis level of F0. The CAP score suggests there is hepatic steatosis. ENDOSCOPIC PROCEDURES:  3/2018. Colonoscopy. RADIOLOGY:  5/2021. Ultrasound of liver. Echogenic consistent with fatty liver. No liver mass lesions. No dilated bile ducts. No ascites. OTHER TESTING:  Not available or performed    FOLLOW-UP:  All of the issues listed above in the Assessment and Plan were discussed with the patient. All questions were answered. The patient expressed a clear understanding of the above. [address] [address] in 6 months for ongoing monitoring and treatment. Shonna Manzanares, JOSEPHNP-BC  763 [facility] [place] of [address] [address] [address] Nugent, 2000 [facility] 22.  201 [facility]

## 2022-12-16 NOTE — PROGRESS NOTES
Identified pt with two pt identifiers(name and ). Reviewed record in preparation for visit and have obtained necessary documentation. Chief Complaint   Patient presents with    Fatty Liver     4 mo Fibroscan f/u      Vitals:    22 0752   BP: 105/76   Pulse: 61   Resp: 18   Temp: 98.2 °F (36.8 °C)   TempSrc: Temporal   SpO2: 98%   Weight: 195 lb (88.5 kg)   Height: 6' 1\" (1.854 m)   PainSc:   0 - No pain       Health Maintenance Review: Patient reminded of \"due or due soon\" health maintenance. I have asked the patient to contact his/her primary care provider (PCP) for follow-up on his/her health maintenance. Coordination of Care Questionnaire:  :   1) Have you been to an emergency room, urgent care, or hospitalized since your last visit? If yes, where when, and reason for visit? no       2. Have seen or consulted any other health care provider since your last visit? If yes, where when, and reason for visit? YES, [address] [place] [address] [address] Surgical upcoming surgery for Hernia, right lower quadrant, Pulmonologist post Covid infection. Dr Marci Jackson      Patient is accompanied by self I have received verbal consent from Ho Rojas to discuss any/all medical information while they are present in the room.

## 2022-12-19 NOTE — PROGRESS NOTES
Letter sent to the patient regarding the blood work results.  The ALT is now close to normal. The remainder of testing was normal.

## 2023-01-10 ENCOUNTER — OFFICE VISIT (OUTPATIENT)
Dept: NEUROLOGY | Age: 58
End: 2023-01-10
Payer: COMMERCIAL

## 2023-01-10 VITALS
OXYGEN SATURATION: 98 % | DIASTOLIC BLOOD PRESSURE: 84 MMHG | SYSTOLIC BLOOD PRESSURE: 130 MMHG | BODY MASS INDEX: 26.11 KG/M2 | HEIGHT: 73 IN | WEIGHT: 197 LBS | HEART RATE: 96 BPM

## 2023-01-10 DIAGNOSIS — R20.0 NUMBNESS OF RIGHT FOOT: ICD-10-CM

## 2023-01-10 DIAGNOSIS — G25.0 BENIGN HEAD TREMOR: Primary | ICD-10-CM

## 2023-01-10 PROCEDURE — 99214 OFFICE O/P EST MOD 30 MIN: CPT | Performed by: PSYCHIATRY & NEUROLOGY

## 2023-01-10 RX ORDER — ALBUTEROL SULFATE 90 UG/1
AEROSOL, METERED RESPIRATORY (INHALATION)
COMMUNITY
Start: 2022-12-20

## 2023-01-10 NOTE — PROGRESS NOTES
Chief Complaint   Patient presents with    Follow-up     History of benign Schwannoma     Visit Vitals  /84 (BP 1 Location: Left upper arm, BP Patient Position: Sitting)   Pulse 96   Ht 6' 1\" (1.854 m)   Wt 197 lb (89.4 kg)   SpO2 98%   BMI 25.99 kg/m²

## 2023-01-10 NOTE — PROGRESS NOTES
Chief Complaint   Patient presents with    Follow-up     tremor       HPI      Ayana Matthew is a 58-year-old gentleman following up on head tremor. Since I saw him last the tremor remained stable without changes. He also saw podiatry at my recommendation for right toe pain. He has been recommended for EMG looking for peripheral neuropathy from Dr. Hortencia Ruiz. He denies any severe neck pain. Works a very stressful job. Background:  Mr. Tamra Kohler is a 58-year-old gentleman with a history of right-sided schwannoma status post gamma knife with residual hearing loss who is here because of head shaking. He is not sure how long this has been going on exactly but he thinks it started after a Covid infection in fall 2020. His infection was severe enough he was in the hospital for a week on supplemental oxygen but not in the critical care unit. He is since recovered pulmonary wise from that issue but the tremor is getting worse such that his family is noticing it mainly in the evenings after he gets home. He also thinks it might be a little bit in the right hand. He spends all day working remotely from home and is very focused and he has not noticed it. No speech changes or vision changes. No loss of strength. No numbness. Symptoms do not interfere with daily activity. Nobody in the family he can think of with tremor. No difficulty swallowing. Review of Systems   Eyes:  Negative for double vision. Gastrointestinal:  Positive for constipation. Musculoskeletal:  Positive for joint pain. Negative for falls. Neurological:  Positive for tremors. Psychiatric/Behavioral:  Negative for hallucinations. All other systems reviewed and are negative. Past Medical History:   Diagnosis Date    Liver disease      History reviewed. No pertinent family history.   Social History     Socioeconomic History    Marital status:      Spouse name: Not on file    Number of children: Not on file    Years of education: Not on file    Highest education level: Not on file   Occupational History    Not on file   Tobacco Use    Smoking status: Former     Types: Cigarettes     Quit date: 1988     Years since quittin.0    Smokeless tobacco: Never   Vaping Use    Vaping Use: Never used   Substance and Sexual Activity    Alcohol use: Yes     Comment: socially    Drug use: Not Currently    Sexual activity: Not on file   Other Topics Concern    Not on file   Social History Narrative    Not on file     Social Determinants of Health     Financial Resource Strain: Not on file   Food Insecurity: Not on file   Transportation Needs: Not on file   Physical Activity: Not on file   Stress: Not on file   Social Connections: Not on file   Intimate Partner Violence: Not on file   Housing Stability: Not on file     Allergies   Allergen Reactions    Amoxicillin Other (comments)     \"Blood work related to the liver comes back elevated\"    Sulfa (Sulfonamide Antibiotics) Hives and Swelling         Current Outpatient Medications   Medication Sig    albuterol (PROVENTIL HFA, VENTOLIN HFA, PROAIR HFA) 90 mcg/actuation inhaler TAKE TWO PUFFS EVERY 4-6 HOURS AS NEEDED FOR SHORTNESS OF BREATH    ascorbic acid, vitamin C, (VITAMIN C) 500 mg tablet Take 1,000 mg by mouth daily. multivit-min/FA/lycopen/lutein (CENTRUM SILVER MEN PO) Take  by mouth. No current facility-administered medications for this visit. Neurologic Exam     Mental Status   WD/WN adult in NAD, normal grooming  VSS  A&O x 3    PERRL, nonicteric  Face is symmetric, tongue midline  Speech is fluent and clear, mumbling in nature  No limb ataxia. Head tremor noted.   He also has a rightward rotation left head tilt briefly spontaneously and symmetric  Finger-nose with a very slight kinetic tremor but accurate symmetric  Normal gait, not shuffling           Visit Vitals  /84 (BP 1 Location: Left upper arm, BP Patient Position: Sitting)   Pulse 96   Ht 6' 1\" (1.854 m)   Wt 197 lb (89.4 kg)   SpO2 98%   BMI 25.99 kg/m²       Assessment and Plan   Diagnoses and all orders for this visit:    1. Benign head tremor    2. Numbness of right foot  -     EMG NCV MOTOR WITH F/WAVE PER NERVE; Future  19-year-old gentleman who has a head tremor I suspect benign. There has not been any progression since I saw him last.  I am suspicious for a slight cervical dystonia on exam but he is not having any severe pain and he is has no impact on ADLs so I would follow that clinically for now. Tremors are being stable I would not treat either and he agrees. We will check an EMG being ordered by podiatry for the right foot abnormal sensations particularly in the toes. He should let his podiatrist know once the EMG is done so the results can be transferred. I would like him to follow-up towards the end of the year with Dr. Giovanni Still regarding the head tremor. 30 minutes of time was taken in total today reviewing the medical record to include personal review of the neuroimaging, face-to-face time with examination and discussion, and time completing documentation today. I reviewed and decided to continue the current medications. This clinical note was dictated with an electronic dictation software that can make unintentional errors. If there are any questions, please contact me directly for clarification.       2 [facility], [facility] [address]  Diplomate CATHY

## 2023-01-11 ENCOUNTER — TELEPHONE (OUTPATIENT)
Dept: NEUROLOGY | Age: 58
End: 2023-01-11

## 2023-03-13 ENCOUNTER — OFFICE VISIT (OUTPATIENT)
Dept: NEUROLOGY | Age: 58
End: 2023-03-13
Payer: COMMERCIAL

## 2023-03-13 DIAGNOSIS — M79.671 PAIN IN RIGHT FOOT: Primary | ICD-10-CM

## 2023-03-13 PROCEDURE — 95912 NRV CNDJ TEST 11-12 STUDIES: CPT | Performed by: PSYCHIATRY & NEUROLOGY

## 2023-03-13 PROCEDURE — 95886 MUSC TEST DONE W/N TEST COMP: CPT | Performed by: PSYCHIATRY & NEUROLOGY

## 2023-03-13 NOTE — PROGRESS NOTES
[address] [facility]  [address], Misa Solares [address]  Phone [phone] Fax [phone]  Test Date:  3/13/2023    Patient: Isamar Carter : 1965 Physician: Lacey Gomes MD   Sex: Male Height: 6' 1\" Ref Phys: Beni Johnson   ID#: [id number]  Weight: 197 lbs. Technician: Romero Clayton     Patient Complaints:      Patient History / Exam:  58-year-old male who is being evaluated for intermittent pain in the right big toe. NCV & EMG Findings:  Evaluation of the left lateral plantar mixed, the right lateral plantar mixed, the left medial plantar mixed, and the right medial plantar mixed nerves showed prolonged distal peak latency (L6.0, R4.9, L5.8, R5.0 ms). All remaining nerves  were within normal limits. All left vs. right side differences were within normal limits. All F Wave latencies were within normal limits. All F Wave left vs. right side latency differences were within normal limits. All examined muscles (as indicated in the following table) showed no evidence of electrical instability. Impression:    The electrodiagnostic testing is unremarkable except for prolonged distal latencies of medial and lateral plantar nerves bilaterally. This could indicate an underlying tarsal tunnel syndrome on both sides but clinical symptoms are not suggestive of it. No evidence to suggest large fiber peripheral neuropathic process or lumbosacral radiculopathy on the right. Recommendations:   If symptoms worsen or persist, a follow-up with podiatry to try a localized injection or tarsal tunnel release on the right, could be considered    ___________________________  Lacey Gomes MD        Nerve Conduction Studies  Anti Sensory Summary Table     Stim Site NR Peak (ms) Norm Peak (ms) P-T Amp (µV) Norm P-T Amp Onset (ms) Site1 Site2 Delta-P (ms) Dist (cm) Rio (m/s) Norm Rio (m/s)   Left Sup Peroneal Anti Sensory (Ant Lat Mall)  30.1°C   14 cm    2.2 <4.4 6.6 >5.0 1.7 14 cm Ant Lat Mall 2.2 14.0 64 >32   Right Sup Peroneal Anti Sensory (Ant Lat Mall)  31°C   14 cm    2.7 <4.4 13.1 >5.0 2.0 14 cm Ant Lat Mall 2.7 14.0 52 >32   Left Sural Anti Sensory (Lat Mall)  29.9°C   Calf    3.5 <4.0 23.6 >5.0 2.9 Calf Lat Mall 3.5 14.0 40 >35   Right Sural Anti Sensory (Lat Mall)  31.4°C   Calf    3.4 <4.0 14.3 >5.0 2.8 Calf Lat Mall 3.4 14.0 41 >35     Motor Summary Table     Stim Site NR Onset (ms) Norm Onset (ms) O-P Amp (mV) Norm O-P Amp Site1 Site2 Delta-0 (ms) Dist (cm) Rio (m/s) Norm Rio (m/s)   Left Peroneal Motor (Ext Dig Brev)  30.6°C   Ankle    4.0 <6.1 3.6 >2.5 B Fib Ankle 9.0 37.0 41 >38   B Fib    13.0  2.1  Poplt B Fib 2.0 10.0 50 >40   Poplt    15.0  1.6          Right Peroneal Motor (Ext Dig Brev)  31.7°C   Ankle    4.1 <6.1 5.4 >2.5 B Fib Ankle 7.0 37.0 53 >38   B Fib    11.1  5.1  Poplt B Fib 1.5 10.0 67 >40   Poplt    12.6  4.8          Left Tibial Motor (Abd Armijo Brev)  30.9°C   Ankle    4.9 <6.1 9.6 >3.0 Knee Ankle 8.5 41.0 48 >35   Knee    13.4  6.0          Right Tibial Motor (Abd Armijo Brev)  31.9°C   Ankle    3.8 <6.1 5.6 >3.0 Knee Ankle 9.5 42.0 44 >35   Knee    13.3  3.5            Mixed Summary Table     Stim Site NR Peak (ms) Norm Peak (ms) P-T Amp (µV) Norm P-T Amp Site1 Site2 Delta-P (ms) Dist (cm) Rio (m/s) Norm Rio (m/s)   Left Lateral Plantar Mixed (Med Malleolus)  30°C   Lateral Foot    6.0 <3.7 16.0 >8         Right Lateral Plantar Mixed (Med Malleolus)  30°C   Lateral Foot    4.9 <3.7 10.6 >8         Left Medial Plantar Mixed (Med Malleolus)  30°C   Medial Foot    5.8 <3.7 10.1 >10         Right Medial Plantar Mixed (Med Malleolus)  30°C   Medial Foot    5.0 <3.7 11.7 >10           F Wave Studies     NR F-Lat (ms) Lat Norm (ms) L-R F-Lat (ms) L-R Lat Norm   Left Tibial (Mrkrs) (Abd Hallucis)  31.1°C      58.02 <61 0.59 <5.7   Right Tibial (Mrkrs) (Abd Hallucis)  32°C      57.43 <61 0.59 <5.7     EMG     Side Muscle Nerve Root Ins Act Fibs Psw Amp Dur Poly Recrt Int Cresenciano Harrison Comment   Right AntTibialis Dp Br Peronel L4-5 Nml Nml Nml Nml Nml 0 Nml Nml    Right Peroneus Long Sup Br Peronel L5-S1 Nml Nml Nml Nml Nml 0 Nml Nml    Right Gastroc Tibial S1-2 Nml Nml Nml Nml Nml 0 Nml Nml    Right Flex Dig Long Tibial L5-S2 Nml Nml Nml Nml Nml 0 Nml Nml    Right VastusLat Femoral L2-4 Nml Nml Nml Nml Nml 0 Nml Nml    Right Lumbo Parasp Low Rami L5-S1 Nml Nml Nml               Waveforms:

## 2023-05-23 RX ORDER — ASCORBIC ACID 500 MG
1000 TABLET ORAL DAILY
COMMUNITY

## 2023-05-23 RX ORDER — ALBUTEROL SULFATE 90 UG/1
AEROSOL, METERED RESPIRATORY (INHALATION)
COMMUNITY
Start: 2022-12-20

## 2023-06-22 ENCOUNTER — OFFICE VISIT (OUTPATIENT)
Age: 58
End: 2023-06-22
Payer: COMMERCIAL

## 2023-06-22 VITALS
HEIGHT: 73 IN | OXYGEN SATURATION: 98 % | TEMPERATURE: 96.2 F | SYSTOLIC BLOOD PRESSURE: 113 MMHG | BODY MASS INDEX: 26.51 KG/M2 | DIASTOLIC BLOOD PRESSURE: 79 MMHG | WEIGHT: 200 LBS | HEART RATE: 73 BPM

## 2023-06-22 DIAGNOSIS — K76.0 FATTY LIVER: Primary | ICD-10-CM

## 2023-06-22 PROCEDURE — 99214 OFFICE O/P EST MOD 30 MIN: CPT | Performed by: NURSE PRACTITIONER

## 2023-06-22 RX ORDER — TADALAFIL 5 MG/1
5 TABLET ORAL DAILY
COMMUNITY
Start: 2023-05-06

## 2023-06-22 NOTE — PROGRESS NOTES
[facility], MD, FACP, Hammad Valles, [place]      JEANNIE Connolly S L[place], [facility]-BC   Ibis Perez, [facility]   Deja Parker, FNP-C  Laron Willoughby, FNP-C   Bibiana Simmonds, AGPCNP-BC   Rupertarleth Mejia, [facility]      [address]   at [facility]   [address] [facility], [address] Misha Brown  [address].   [phone]   FAX: [phone]   at [address], [address] [place] [address], [facility] [address]   [phone]   FAX: [phone]       Patient Care Team:  Ke Lynch MD as PCP - General    Patient Active Problem List   Diagnosis    Fatty liver    History of COVID-19    Chronic cough    H/O sinus surgery       Prisca Stevens is being seen at [address] for management of elevated liver enzymes that is suspected to be from fatty liver. The active problem list, all pertinent past medical history, medications, radiologic findings and laboratory findings related to the liver disorder were reviewed and discussed with the patient. The patient is a 60 y.o.  male who was found to have elevated liver transaminases in 2018. The patient was on Augmentin and the elevation was thought to be DILI. The liver enzymes remained elevated after discontinuation of the medication. Serologic evaluation for markers of chronic liver disease were negative. The patient was diagnosed with COVID 9/2020, liver enzymes elevated further but returned to baseline elevation after resolution. Assessment of liver fibrosis was performed with Fibroscan 4/2021. The result was 4.6 kPa which correlates with no fibrosis. The CAP score of 360 suggests hepatic steatosis. Assessment of liver fibrosis was performed with Fibroscan 12/2022. The result was 4.4 kPa which correlates with no fibrosis.  The CAP

## 2023-06-22 NOTE — PROGRESS NOTES
Identified pt with two pt identifiers(name and ). Reviewed record in preparation for visit and have obtained necessary documentation. Chief Complaint   Patient presents with    Follow-up     /79 (Site: Left Upper Arm, Position: Sitting, Cuff Size: Medium Adult)   Pulse 73   Temp (!) 96.2 °F (35.7 °C) (Temporal)   Ht 6' 1\" (1.854 m)   Wt 200 lb (90.7 kg)   SpO2 98%   BMI 26.39 kg/m²       1. \"Have you been to the ER, urgent care clinic since your last visit? Hospitalized since your last visit? \"  Yes per he had hernia surgery in 2023    2. \"Have you seen or consulted any other health care providers outside of the [address] [place] [address] since your last visit? \"  Yes per pt for a sinus infection    Patient is accompanied by  self I have received verbal consent from Amanda Stacy to discuss any/all medical information while they are present in the room.

## 2023-06-23 LAB
ALBUMIN SERPL-MCNC: 3.9 G/DL (ref 3.5–5)
ALBUMIN/GLOB SERPL: 1.2 (ref 1.1–2.2)
ALP SERPL-CCNC: 86 U/L (ref 45–117)
ALT SERPL-CCNC: 72 U/L (ref 12–78)
ANION GAP SERPL CALC-SCNC: 1 MMOL/L (ref 5–15)
AST SERPL-CCNC: 36 U/L (ref 15–37)
BASOPHILS # BLD: 0.1 K/UL (ref 0–0.1)
BASOPHILS NFR BLD: 1 % (ref 0–1)
BILIRUB DIRECT SERPL-MCNC: <0.1 MG/DL (ref 0–0.2)
BILIRUB SERPL-MCNC: 0.4 MG/DL (ref 0.2–1)
BUN SERPL-MCNC: 14 MG/DL (ref 6–20)
BUN/CREAT SERPL: 12 (ref 12–20)
CALCIUM SERPL-MCNC: 8.9 MG/DL (ref 8.5–10.1)
CHLORIDE SERPL-SCNC: 111 MMOL/L (ref 97–108)
CO2 SERPL-SCNC: 29 MMOL/L (ref 21–32)
CREAT SERPL-MCNC: 1.18 MG/DL (ref 0.7–1.3)
DIFFERENTIAL METHOD BLD: ABNORMAL
EOSINOPHIL # BLD: 0.1 K/UL (ref 0–0.4)
EOSINOPHIL NFR BLD: 2 % (ref 0–7)
ERYTHROCYTE [DISTWIDTH] IN BLOOD BY AUTOMATED COUNT: 14.2 % (ref 11.5–14.5)
GLOBULIN SER CALC-MCNC: 3.3 G/DL (ref 2–4)
GLUCOSE SERPL-MCNC: 91 MG/DL (ref 65–100)
HCT VFR BLD AUTO: 49.1 % (ref 36.6–50.3)
HGB BLD-MCNC: 14.7 G/DL (ref 12.1–17)
IMM GRANULOCYTES # BLD AUTO: 0 K/UL (ref 0–0.04)
IMM GRANULOCYTES NFR BLD AUTO: 0 % (ref 0–0.5)
LYMPHOCYTES # BLD: 1.6 K/UL (ref 0.8–3.5)
LYMPHOCYTES NFR BLD: 36 % (ref 12–49)
MCH RBC QN AUTO: 26.4 PG (ref 26–34)
MCHC RBC AUTO-ENTMCNC: 29.9 G/DL (ref 30–36.5)
MCV RBC AUTO: 88.2 FL (ref 80–99)
MONOCYTES # BLD: 0.3 K/UL (ref 0–1)
MONOCYTES NFR BLD: 8 % (ref 5–13)
NEUTS SEG # BLD: 2.4 K/UL (ref 1.8–8)
NEUTS SEG NFR BLD: 53 % (ref 32–75)
NRBC # BLD: 0 K/UL (ref 0–0.01)
NRBC BLD-RTO: 0 PER 100 WBC
PLATELET # BLD AUTO: 230 K/UL (ref 150–400)
PMV BLD AUTO: 10.1 FL (ref 8.9–12.9)
POTASSIUM SERPL-SCNC: 4.5 MMOL/L (ref 3.5–5.1)
PROT SERPL-MCNC: 7.2 G/DL (ref 6.4–8.2)
RBC # BLD AUTO: 5.57 M/UL (ref 4.1–5.7)
SODIUM SERPL-SCNC: 141 MMOL/L (ref 136–145)
WBC # BLD AUTO: 4.4 K/UL (ref 4.1–11.1)

## 2023-10-11 LAB — HBA1C MFR BLD HPLC: 6 %

## 2023-10-27 ENCOUNTER — TELEPHONE (OUTPATIENT)
Age: 58
End: 2023-10-27

## 2023-10-27 ENCOUNTER — OFFICE VISIT (OUTPATIENT)
Age: 58
End: 2023-10-27
Payer: COMMERCIAL

## 2023-10-27 VITALS
SYSTOLIC BLOOD PRESSURE: 118 MMHG | HEIGHT: 73 IN | OXYGEN SATURATION: 100 % | DIASTOLIC BLOOD PRESSURE: 72 MMHG | RESPIRATION RATE: 16 BRPM | HEART RATE: 88 BPM | WEIGHT: 200 LBS | BODY MASS INDEX: 26.51 KG/M2

## 2023-10-27 DIAGNOSIS — G25.2 DYSTONIC TREMOR: ICD-10-CM

## 2023-10-27 DIAGNOSIS — R20.0 NUMBNESS OF RIGHT FOOT: ICD-10-CM

## 2023-10-27 DIAGNOSIS — G24.3 CERVICAL DYSTONIA: Primary | ICD-10-CM

## 2023-10-27 PROCEDURE — 99214 OFFICE O/P EST MOD 30 MIN: CPT | Performed by: PSYCHIATRY & NEUROLOGY

## 2023-10-27 RX ORDER — DOXYCYCLINE 100 MG/1
CAPSULE ORAL
COMMUNITY
Start: 2023-10-02

## 2023-10-27 RX ORDER — NABUMETONE 500 MG/1
1 TABLET, FILM COATED ORAL 2 TIMES DAILY
COMMUNITY
Start: 2022-06-16

## 2023-10-27 RX ORDER — BETAMETHASONE SODIUM PHOSPHATE AND BETAMETHASONE ACETATE 3; 3 MG/ML; MG/ML
6 INJECTION, SUSPENSION INTRA-ARTICULAR; INTRALESIONAL; INTRAMUSCULAR; SOFT TISSUE ONCE
COMMUNITY
Start: 2022-04-29

## 2023-10-27 RX ORDER — GABAPENTIN 300 MG/1
CAPSULE ORAL
COMMUNITY

## 2023-10-27 RX ORDER — AMOXICILLIN 500 MG/1
CAPSULE ORAL
COMMUNITY
Start: 2023-10-25

## 2023-10-27 RX ORDER — BENZONATATE 200 MG/1
CAPSULE ORAL
COMMUNITY

## 2023-10-27 RX ORDER — OXYCODONE AND ACETAMINOPHEN 7.5; 325 MG/1; MG/1
TABLET ORAL
COMMUNITY

## 2023-10-27 RX ORDER — CYCLOBENZAPRINE HCL 10 MG
TABLET ORAL
COMMUNITY

## 2023-10-27 RX ORDER — PREDNISONE 20 MG/1
TABLET ORAL
COMMUNITY

## 2023-10-27 RX ORDER — CEFDINIR 300 MG/1
CAPSULE ORAL
COMMUNITY

## 2023-10-27 RX ORDER — LIDOCAINE HYDROCHLORIDE 10 MG/ML
40 INJECTION, SOLUTION INFILTRATION; PERINEURAL ONCE
COMMUNITY
Start: 2022-04-29

## 2023-10-27 ASSESSMENT — PATIENT HEALTH QUESTIONNAIRE - PHQ9
SUM OF ALL RESPONSES TO PHQ9 QUESTIONS 1 & 2: 0
SUM OF ALL RESPONSES TO PHQ QUESTIONS 1-9: 0
SUM OF ALL RESPONSES TO PHQ QUESTIONS 1-9: 0
2. FEELING DOWN, DEPRESSED OR HOPELESS: 0
SUM OF ALL RESPONSES TO PHQ QUESTIONS 1-9: 0
1. LITTLE INTEREST OR PLEASURE IN DOING THINGS: 0
SUM OF ALL RESPONSES TO PHQ QUESTIONS 1-9: 0

## 2023-10-27 NOTE — PROGRESS NOTES
strength. The palate rises symmetrically and the tongue protrudes midline. Dystonic neck posture was noted with chin turned to the right and had tilt to the left. There is visible hypertrophy of the left sternocleidomastoid muscle. He has full neck range of motion    Motor Examination: Normal tone, bulk, and strength. 5/5 muscle strength throughout. No cogwheel rigidity or clonus present. Sensory exam:  Normal throughout to pinprick, temperature, and vibration sense. Normal proprioception. Coordination:  Finger to nose and rapid arm movement testing was normal.   No resting or intention tremor    Gait and Station:  Steady while walking on toes, heels, and with tandem walking. Normal arm swing. No Rhomberg or pronator drift. No muscle wasting or fasiculations noted. Reflexes:  DTRs 2+ throughout. Toes downgoing. LABS / IMAGING  MRI Result (most recent):  MRI BRAIN WO CONTRAST 06/30/2021    Narrative  EXAM: MRI BRAIN WO CONT    INDICATION: 62-year-old gentleman with history of right schwannoma status post  gamma knife having new abnormal head movements please do MRI brain screening    COMPARISON: MRI brain 5/4/2020. CONTRAST: None. TECHNIQUE:  Multiplanar multisequence acquisition without contrast of the brain. FINDINGS:  There is a tiny 2 mm nodular focus at the fundus of the right internal auditory  canal (series 12 image 32), which is unchanged. No mass is present within the  cerebellopontine angles or brainstem. The cochlea, vestibule, and semicircular  canals have a normal appearance. There is no evidence of enlargement of the  vestibular aqueducts. The ventricles are normal in size and position. The brain parenchyma has normal  signal characteristics for age. There is no acute infarct, hemorrhage,  extra-axial fluid collection, or mass effect. There is no cerebellar tonsillar  herniation. Expected arterial flow-voids are present.     There are postsurgical changes of

## 2023-12-01 ENCOUNTER — TELEPHONE (OUTPATIENT)
Age: 58
End: 2023-12-01

## 2023-12-01 NOTE — TELEPHONE ENCOUNTER
Patient would like a call back to let him know if his prior authorization request for Botox has been approved. Patient has not heard back from our office since his appointment in October.

## 2023-12-11 ENCOUNTER — TELEPHONE (OUTPATIENT)
Age: 58
End: 2023-12-11

## 2023-12-11 NOTE — TELEPHONE ENCOUNTER
Patient is calling in regarding his prior authorization request for Botox. Patient has not heard back from our office since his appointment in October.    PSR informed the patient that the prior authorization specialist said she is going to submit another request and will call him back in 3-5 business days when it processes.    Patient was thankful for the update.

## 2023-12-20 NOTE — TELEPHONE ENCOUNTER
Patient is requesting status of PA for Botox. Patient states he doesn't understand what's the hold up and requesting to speak with someone higher up.    Please contact    Previous encounter was sent to Halle Rerouting encounter to April

## 2024-01-03 ENCOUNTER — OFFICE VISIT (OUTPATIENT)
Age: 59
End: 2024-01-03
Payer: COMMERCIAL

## 2024-01-03 VITALS
HEIGHT: 73 IN | SYSTOLIC BLOOD PRESSURE: 112 MMHG | TEMPERATURE: 97 F | HEART RATE: 74 BPM | BODY MASS INDEX: 26.37 KG/M2 | DIASTOLIC BLOOD PRESSURE: 70 MMHG | OXYGEN SATURATION: 98 % | WEIGHT: 199 LBS

## 2024-01-03 DIAGNOSIS — R25.1 TREMOR: ICD-10-CM

## 2024-01-03 DIAGNOSIS — K76.0 FATTY LIVER: Primary | ICD-10-CM

## 2024-01-03 LAB
ALBUMIN SERPL-MCNC: 4 G/DL (ref 3.5–5)
ALBUMIN/GLOB SERPL: 1.1 (ref 1.1–2.2)
ALP SERPL-CCNC: 92 U/L (ref 45–117)
ALT SERPL-CCNC: 63 U/L (ref 12–78)
ANION GAP SERPL CALC-SCNC: 4 MMOL/L (ref 5–15)
AST SERPL-CCNC: 25 U/L (ref 15–37)
BASOPHILS # BLD: 0 K/UL (ref 0–0.1)
BASOPHILS NFR BLD: 1 % (ref 0–1)
BILIRUB DIRECT SERPL-MCNC: 0.2 MG/DL (ref 0–0.2)
BILIRUB SERPL-MCNC: 0.7 MG/DL (ref 0.2–1)
BUN SERPL-MCNC: 14 MG/DL (ref 6–20)
BUN/CREAT SERPL: 12 (ref 12–20)
CALCIUM SERPL-MCNC: 9.1 MG/DL (ref 8.5–10.1)
CHLORIDE SERPL-SCNC: 109 MMOL/L (ref 97–108)
CO2 SERPL-SCNC: 28 MMOL/L (ref 21–32)
CREAT SERPL-MCNC: 1.21 MG/DL (ref 0.7–1.3)
DIFFERENTIAL METHOD BLD: NORMAL
EOSINOPHIL # BLD: 0.1 K/UL (ref 0–0.4)
EOSINOPHIL NFR BLD: 3 % (ref 0–7)
ERYTHROCYTE [DISTWIDTH] IN BLOOD BY AUTOMATED COUNT: 14.4 % (ref 11.5–14.5)
GLOBULIN SER CALC-MCNC: 3.8 G/DL (ref 2–4)
GLUCOSE SERPL-MCNC: 97 MG/DL (ref 65–100)
HCT VFR BLD AUTO: 48 % (ref 36.6–50.3)
HGB BLD-MCNC: 15.3 G/DL (ref 12.1–17)
IMM GRANULOCYTES # BLD AUTO: 0 K/UL (ref 0–0.04)
IMM GRANULOCYTES NFR BLD AUTO: 0 % (ref 0–0.5)
LYMPHOCYTES # BLD: 1.7 K/UL (ref 0.8–3.5)
LYMPHOCYTES NFR BLD: 37 % (ref 12–49)
MCH RBC QN AUTO: 27.1 PG (ref 26–34)
MCHC RBC AUTO-ENTMCNC: 31.9 G/DL (ref 30–36.5)
MCV RBC AUTO: 85 FL (ref 80–99)
MONOCYTES # BLD: 0.3 K/UL (ref 0–1)
MONOCYTES NFR BLD: 7 % (ref 5–13)
NEUTS SEG # BLD: 2.4 K/UL (ref 1.8–8)
NEUTS SEG NFR BLD: 52 % (ref 32–75)
NRBC # BLD: 0 K/UL (ref 0–0.01)
NRBC BLD-RTO: 0 PER 100 WBC
PLATELET # BLD AUTO: 232 K/UL (ref 150–400)
PMV BLD AUTO: 9.9 FL (ref 8.9–12.9)
POTASSIUM SERPL-SCNC: 4.9 MMOL/L (ref 3.5–5.1)
PROT SERPL-MCNC: 7.8 G/DL (ref 6.4–8.2)
RBC # BLD AUTO: 5.65 M/UL (ref 4.1–5.7)
SODIUM SERPL-SCNC: 141 MMOL/L (ref 136–145)
WBC # BLD AUTO: 4.5 K/UL (ref 4.1–11.1)

## 2024-01-03 PROCEDURE — 99214 OFFICE O/P EST MOD 30 MIN: CPT | Performed by: NURSE PRACTITIONER

## 2024-01-03 PROCEDURE — 91200 LIVER ELASTOGRAPHY: CPT | Performed by: NURSE PRACTITIONER

## 2024-01-03 NOTE — PROGRESS NOTES
Identified pt with two pt identifiers(name and ). Reviewed record in preparation for visit and have obtained necessary documentation.    Chief Complaint   Patient presents with    Other     /70 (Site: Left Upper Arm, Position: Sitting, Cuff Size: Large Adult)   Pulse 74   Temp 97 °F (36.1 °C) (Temporal)   Ht 1.854 m (6' 1\")   Wt 90.3 kg (199 lb)   SpO2 98%   BMI 26.25 kg/m²       1. \"Have you been to the ER, urgent care clinic since your last visit?  Hospitalized since your last visit?\" No    2. \"Have you seen or consulted any other health care providers outside of the [facility] since your last visit?\" Yes per pt for a sinus infection in 2023      Patient is accompanied by self  I have received verbal consent from Ernesto Haynes to discuss any/all medical information while they are present in the room.        
percussion/palpation.  Spleen not palpable. No obvious ascites.  Extremities: No edema.  No muscle wasting.  No gross arthritic changes.  Neurologic: Alert and oriented.  Cranial nerves grossly intact.  No asterixis.    LABORATORY STUDIES:   Latest Ref Rng 12/16/2022 6/22/2023   [facility]      WBC 4.1 - 11.1 K/uL 4.9  4.4    ANC 1.8 - 8.0 K/UL 2.6  2.4    HGB 12.1 - 17.0 g/dL 14.6  14.7     - 400 K/uL 245  230    INR 0.9 - 1.1      AST 15 - 37 U/L 22  36    ALT 12 - 78 U/L 50  72    Alk Phos 45 - 117 U/L 86  86    Bili, Total 0.2 - 1.0 MG/DL 0.5  0.4    Bili, Direct 0.0 - 0.2 MG/DL 0.1  <0.1    Albumin 3.5 - 5.0 g/dL 4.1  3.9    BUN 6 - 20 MG/DL 10  14    Creat 0.70 - 1.30 MG/DL 0.95  1.18    Na 136 - 145 mmol/L 142  141    K 3.5 - 5.1 mmol/L 4.6  4.5    Cl 97 - 108 mmol/L 110 (H)  111 (H)    CO2 21 - 32 mmol/L 28  29    Glucose 65 - 100 mg/dL 89  91       Laboratory testing from 6/22/2023 reviewed in detail. Additional testing included to evaluate progression or regression of disease. Laboratory testing results from today will be communicated by mail.     SEROLOGIES:  2/2018.  anti-HBcore negative, anti-HCV negative, Ferritin 348, iron saturation 39%, TEJA negative, Anti-LKM negative, AMA negative, ceruloplsmin 31, alpha-1-antitrypsin 95, TSH 2.4    LIVER HISTOLOGY:  4/2021.  FibroScan performed at [facility]. EkPa was 4.6.  . The results suggested a fibrosis level of F0.The CAP score suggests there is hepatic steatosis.    12/2022.  FibroScan performed at [facility]. EkPa was 4.4.  IQR/med 16%.  .   The results suggested a fibrosis level of F0.  The CAP score suggests there is hepatic steatosis.    1/2024.  FibroScan performed at [facility]. EkPa was 4.9.  IQR/med 7%.  .   The results suggested a fibrosis level of F0. The CAP score suggests there is hepatic steatosis.      ENDOSCOPIC PROCEDURES:  3/2018.

## 2024-04-28 ENCOUNTER — OFFICE VISIT (OUTPATIENT)
Age: 59
End: 2024-04-28

## 2024-04-28 VITALS
SYSTOLIC BLOOD PRESSURE: 101 MMHG | WEIGHT: 200 LBS | HEART RATE: 103 BPM | HEIGHT: 74 IN | BODY MASS INDEX: 25.67 KG/M2 | RESPIRATION RATE: 26 BRPM | DIASTOLIC BLOOD PRESSURE: 70 MMHG | OXYGEN SATURATION: 96 % | TEMPERATURE: 98.8 F

## 2024-04-28 DIAGNOSIS — J40 BRONCHITIS: ICD-10-CM

## 2024-04-28 DIAGNOSIS — R05.1 ACUTE COUGH: Primary | ICD-10-CM

## 2024-04-28 LAB
EXP DATE SOLUTION: NORMAL
EXP DATE SWAB: NORMAL
EXPIRATION DATE: NORMAL
LOT NUMBER POC: NORMAL
LOT NUMBER SOLUTION: NORMAL
LOT NUMBER SWAB: NORMAL
SARS-COV-2 RNA, POC: NEGATIVE

## 2024-04-28 RX ORDER — DEXTROMETHORPHAN HYDROBROMIDE AND PROMETHAZINE HYDROCHLORIDE 15; 6.25 MG/5ML; MG/5ML
5 SYRUP ORAL 4 TIMES DAILY PRN
Qty: 140 ML | Refills: 0 | Status: SHIPPED | OUTPATIENT
Start: 2024-04-28 | End: 2024-05-05

## 2024-04-28 RX ORDER — DEXTROMETHORPHAN HYDROBROMIDE AND PROMETHAZINE HYDROCHLORIDE 15; 6.25 MG/5ML; MG/5ML
5 SYRUP ORAL 4 TIMES DAILY PRN
Qty: 140 ML | Refills: 0 | Status: SHIPPED | OUTPATIENT
Start: 2024-04-28 | End: 2024-04-28

## 2024-04-28 RX ORDER — PREDNISONE 10 MG/1
TABLET ORAL
Qty: 1 EACH | Refills: 0 | Status: SHIPPED | OUTPATIENT
Start: 2024-04-28 | End: 2024-04-28

## 2024-04-28 RX ORDER — PREDNISONE 10 MG/1
TABLET ORAL
Qty: 1 EACH | Refills: 0 | Status: SHIPPED | OUTPATIENT
Start: 2024-04-28

## 2024-05-01 ENCOUNTER — OFFICE VISIT (OUTPATIENT)
Age: 59
End: 2024-05-01

## 2024-05-01 VITALS
TEMPERATURE: 98.4 F | OXYGEN SATURATION: 97 % | HEIGHT: 73 IN | DIASTOLIC BLOOD PRESSURE: 71 MMHG | SYSTOLIC BLOOD PRESSURE: 105 MMHG | RESPIRATION RATE: 20 BRPM | HEART RATE: 72 BPM | BODY MASS INDEX: 26.51 KG/M2 | WEIGHT: 200 LBS

## 2024-05-01 DIAGNOSIS — J40 BRONCHITIS: Primary | ICD-10-CM

## 2024-05-01 DIAGNOSIS — R05.1 ACUTE COUGH: ICD-10-CM

## 2024-05-01 RX ORDER — FLUTICASONE PROPIONATE AND SALMETEROL 100; 50 UG/1; UG/1
1 POWDER RESPIRATORY (INHALATION) EVERY 12 HOURS
Qty: 1 EACH | Refills: 0 | Status: SHIPPED | OUTPATIENT
Start: 2024-05-01 | End: 2024-05-31

## 2024-05-01 NOTE — PROGRESS NOTES
eye: No discharge.         Left eye: No discharge.      Conjunctiva/sclera: Conjunctivae normal.   Cardiovascular:      Rate and Rhythm: Normal rate and regular rhythm.      Heart sounds: No murmur heard.     No friction rub. No gallop.   Pulmonary:      Effort: Pulmonary effort is normal. No respiratory distress.      Breath sounds: Normal breath sounds. No wheezing, rhonchi or rales.   Skin:     General: Skin is warm and dry.      Findings: No erythema or rash.   Neurological:      General: No focal deficit present.      Mental Status: He is alert and oriented to person, place, and time.   Psychiatric:         Mood and Affect: Mood normal.         Behavior: Behavior normal.         Thought Content: Thought content normal.         Judgment: Judgment normal.          XR CHEST PA/LAT  Order: [id number]  Status: Final result       Visible to patient: No (not released)       Next appt: 07/03/2024 at 03:00 PM in Hepatology (YARON Carpio - NP)       Dx: Bronchitis; Acute cough    0 Result Notes  Details    Reading Physician Reading Date Result Priority   Neisha Mariee MD  [phone] 5/1/2024      Narrative & Impression  Examination  Description: Chest xray, frontal and lateral views     Comparisons:  None provided.       Findings  The cardiomediastinal silhouette is within normal limits.      The lungs are clear. No infiltrate or consolidation noted.     No pleural effusions are seen.     There is no pneumothorax present.      The soft tissue and osseous structures appear unremarkable.     IMPRESSION:     No infiltrate or other acute findings identified.     Electronically signed by: Neisha Mariee M.D. on 05/01/2024 01:55:15 PM   /E[place]       An electronic signature was used to authenticate this note.    --Evelin Dyson PA-C

## 2024-05-01 NOTE — PATIENT INSTRUCTIONS
Acute bronchitis -  XR negative for infiltrate  Continue cough medicines and albuterol as prescribed  Add Advair twice daily  Drink plenty fludis Rest  Counseled coughs can linger  Call or return to clinic if no improvement or any worsening

## 2024-07-03 ENCOUNTER — OFFICE VISIT (OUTPATIENT)
Age: 59
End: 2024-07-03
Payer: COMMERCIAL

## 2024-07-03 VITALS
BODY MASS INDEX: 26.45 KG/M2 | HEIGHT: 73 IN | DIASTOLIC BLOOD PRESSURE: 65 MMHG | SYSTOLIC BLOOD PRESSURE: 117 MMHG | WEIGHT: 199.6 LBS | RESPIRATION RATE: 18 BRPM | TEMPERATURE: 98.6 F

## 2024-07-03 DIAGNOSIS — K76.0 FATTY LIVER: Primary | ICD-10-CM

## 2024-07-03 LAB
ALBUMIN SERPL-MCNC: 3.8 G/DL (ref 3.5–5)
ALBUMIN/GLOB SERPL: 1.3 (ref 1.1–2.2)
ALP SERPL-CCNC: 90 U/L (ref 45–117)
ALT SERPL-CCNC: 42 U/L (ref 12–78)
ANION GAP SERPL CALC-SCNC: 7 MMOL/L (ref 5–15)
AST SERPL-CCNC: 23 U/L (ref 15–37)
BASOPHILS # BLD: 0.1 K/UL (ref 0–0.1)
BASOPHILS NFR BLD: 1 % (ref 0–1)
BILIRUB DIRECT SERPL-MCNC: <0.1 MG/DL (ref 0–0.2)
BILIRUB SERPL-MCNC: 0.4 MG/DL (ref 0.2–1)
BUN SERPL-MCNC: 14 MG/DL (ref 6–20)
BUN/CREAT SERPL: 12 (ref 12–20)
CALCIUM SERPL-MCNC: 8.7 MG/DL (ref 8.5–10.1)
CHLORIDE SERPL-SCNC: 110 MMOL/L (ref 97–108)
CO2 SERPL-SCNC: 24 MMOL/L (ref 21–32)
CREAT SERPL-MCNC: 1.16 MG/DL (ref 0.7–1.3)
DIFFERENTIAL METHOD BLD: ABNORMAL
EOSINOPHIL # BLD: 0.2 K/UL (ref 0–0.4)
EOSINOPHIL NFR BLD: 4 % (ref 0–7)
ERYTHROCYTE [DISTWIDTH] IN BLOOD BY AUTOMATED COUNT: 14.7 % (ref 11.5–14.5)
GLOBULIN SER CALC-MCNC: 2.9 G/DL (ref 2–4)
GLUCOSE SERPL-MCNC: 139 MG/DL (ref 65–100)
HCT VFR BLD AUTO: 42.8 % (ref 36.6–50.3)
HGB BLD-MCNC: 13.7 G/DL (ref 12.1–17)
IMM GRANULOCYTES # BLD AUTO: 0 K/UL (ref 0–0.04)
IMM GRANULOCYTES NFR BLD AUTO: 0 % (ref 0–0.5)
LYMPHOCYTES # BLD: 2.1 K/UL (ref 0.8–3.5)
LYMPHOCYTES NFR BLD: 43 % (ref 12–49)
MCH RBC QN AUTO: 26.9 PG (ref 26–34)
MCHC RBC AUTO-ENTMCNC: 32 G/DL (ref 30–36.5)
MCV RBC AUTO: 83.9 FL (ref 80–99)
MONOCYTES # BLD: 0.4 K/UL (ref 0–1)
MONOCYTES NFR BLD: 8 % (ref 5–13)
NEUTS SEG # BLD: 2.2 K/UL (ref 1.8–8)
NEUTS SEG NFR BLD: 44 % (ref 32–75)
NRBC # BLD: 0 K/UL (ref 0–0.01)
NRBC BLD-RTO: 0 PER 100 WBC
PLATELET # BLD AUTO: 222 K/UL (ref 150–400)
PMV BLD AUTO: 10.2 FL (ref 8.9–12.9)
POTASSIUM SERPL-SCNC: 3.8 MMOL/L (ref 3.5–5.1)
PROT SERPL-MCNC: 6.7 G/DL (ref 6.4–8.2)
RBC # BLD AUTO: 5.1 M/UL (ref 4.1–5.7)
SODIUM SERPL-SCNC: 141 MMOL/L (ref 136–145)
WBC # BLD AUTO: 4.9 K/UL (ref 4.1–11.1)

## 2024-07-03 PROCEDURE — 99213 OFFICE O/P EST LOW 20 MIN: CPT | Performed by: NURSE PRACTITIONER

## 2024-07-03 ASSESSMENT — PATIENT HEALTH QUESTIONNAIRE - PHQ9
2. FEELING DOWN, DEPRESSED OR HOPELESS: NOT AT ALL
SUM OF ALL RESPONSES TO PHQ9 QUESTIONS 1 & 2: 0
SUM OF ALL RESPONSES TO PHQ QUESTIONS 1-9: 0
DEPRESSION UNABLE TO ASSESS: FUNCTIONAL CAPACITY MOTIVATION LIMITS ACCURACY
1. LITTLE INTEREST OR PLEASURE IN DOING THINGS: NOT AT ALL
SUM OF ALL RESPONSES TO PHQ QUESTIONS 1-9: 0

## 2024-07-03 NOTE — PROGRESS NOTES
Identified pt with two pt identifiers(name and ). Reviewed record in preparation for visit and have obtained necessary documentation.  Vitals:    24 1456   BP: 117/65   Site: Left Upper Arm   Position: Sitting   Cuff Size: Medium Adult   Resp: 18   Temp: 98.6 °F (37 °C)   TempSrc: Temporal   Weight: 90.5 kg (199 lb 9.6 oz)   Height: 1.854 m (6' 1\")        Health Maintenance Review: Patient reminded of \"due or due soon\" health maintenance. I have asked the patient to contact his/her primary care provider (PCP) for follow-up on his/her health maintenance.    Coordination of Care Questionnaire:  :   1) Have you been to an emergency room, urgent care, or hospitalized since your last visit?  If yes, where when, and reason for visit? no       2. Have seen or consulted any other health care provider since your last visit?   If yes, where when, and reason for visit?  No      Patient is accompanied by self I have received verbal consent from Ernesto Haynes to discuss any/all medical information while they are present in the room.   
0.70 - 1.30 MG/DL 1.18  1.21    Na 136 - 145 mmol/L 141  141    K 3.5 - 5.1 mmol/L 4.5  4.9    Cl 97 - 108 mmol/L 111 (H)  109 (H)    CO2 21 - 32 mmol/L 29  28    Glucose 65 - 100 mg/dL 91  97      Laboratory testing from 1/03/2024 reviewed in detail. Additional testing included to evaluate progression or regression of disease. Laboratory testing results from today will be communicated by My Chart.     SEROLOGIES:  2/2018.  anti-HBcore negative, anti-HCV negative, Ferritin 348, iron saturation 39%, TEJA negative, Anti-LKM negative, AMA negative, ceruloplsmin 31, alpha-1-antitrypsin 95, TSH 2.4    LIVER HISTOLOGY:  4/2021.  FibroScan performed at [facility]. EkPa was 4.6.  . The results suggested a fibrosis level of F0.The CAP score suggests there is hepatic steatosis.    12/2022.  FibroScan performed at [facility]. EkPa was 4.4.  IQR/med 16%.  .   The results suggested a fibrosis level of F0.  The CAP score suggests there is hepatic steatosis.    1/2024.  FibroScan performed at [facility]. EkPa was 4.9.  IQR/med 7%.  .   The results suggested a fibrosis level of F0. The CAP score suggests there is hepatic steatosis.      ENDOSCOPIC PROCEDURES:  3/2018.  Colonoscopy.    RADIOLOGY:  5/2021.  Ultrasound of liver.  Echogenic consistent with fatty liver.  No liver mass lesions.  No dilated bile ducts.  No ascites.    OTHER TESTING:  Not available or performed  FOLLOW-UP:  All of the issues listed above in the Assessment and Plan were discussed with the patient.  All questions were answered.  The patient expressed a clear understanding of the above.    Follow-up [facility] in 6 months for a Fibroscan.         MARLY Cristobal  [address], [address]  [place]  [address] [phone]  [facility]

## 2024-12-16 ENCOUNTER — TELEPHONE (OUTPATIENT)
Age: 59
End: 2024-12-16

## 2024-12-16 NOTE — TELEPHONE ENCOUNTER
Patient would like a call back from the  to discuss scheduling issues he is having with our office.    Patient states it is regarding getting scheduled for Botox appointments.    Patient states he has been trying for over a year to get scheduled for a Botox appointment and it keeps getting moved or cancelled.    Patient is very upset and states he has tried multiple times with no success to get a call back from the .

## 2025-01-13 ENCOUNTER — OFFICE VISIT (OUTPATIENT)
Age: 60
End: 2025-01-13
Payer: COMMERCIAL

## 2025-01-13 VITALS
HEART RATE: 94 BPM | DIASTOLIC BLOOD PRESSURE: 84 MMHG | SYSTOLIC BLOOD PRESSURE: 132 MMHG | BODY MASS INDEX: 26.37 KG/M2 | HEIGHT: 73 IN | OXYGEN SATURATION: 97 % | WEIGHT: 199 LBS

## 2025-01-13 DIAGNOSIS — R20.0 NUMBNESS OF RIGHT FOOT: ICD-10-CM

## 2025-01-13 DIAGNOSIS — G24.3 CERVICAL DYSTONIA: Primary | ICD-10-CM

## 2025-01-13 PROCEDURE — 99213 OFFICE O/P EST LOW 20 MIN: CPT | Performed by: PSYCHIATRY & NEUROLOGY

## 2025-01-13 RX ORDER — DICLOFENAC SODIUM 75 MG/1
TABLET, DELAYED RELEASE ORAL
COMMUNITY
Start: 2024-07-05

## 2025-01-13 RX ORDER — FLUTICASONE PROPIONATE AND SALMETEROL 100; 50 UG/1; UG/1
POWDER RESPIRATORY (INHALATION)
COMMUNITY
Start: 2024-05-13

## 2025-01-13 RX ORDER — CLOBETASOL PROPIONATE 0.5 MG/ML
SOLUTION TOPICAL
COMMUNITY
Start: 2024-12-09

## 2025-01-13 RX ORDER — ALBUTEROL SULFATE 90 UG/1
INHALANT RESPIRATORY (INHALATION)
COMMUNITY

## 2025-01-13 NOTE — PROGRESS NOTES
Chief Complaint   Patient presents with    Follow-up     Cervical Dystonia, here to restart Botox reauthorization process.     Vitals:    01/13/25 0901   BP: 132/84   Pulse: 94   SpO2: 97%       
auditory  canal (series 12 image 32), which is unchanged. No mass is present within the  cerebellopontine angles or brainstem. The cochlea, vestibule, and semicircular  canals have a normal appearance. There is no evidence of enlargement of the  vestibular aqueducts.    The ventricles are normal in size and position. The brain parenchyma has normal  signal characteristics for age. There is no acute infarct, hemorrhage,  extra-axial fluid collection, or mass effect. There is no cerebellar tonsillar  herniation. Expected arterial flow-voids are present.    There are postsurgical changes of FESS with bilateral maxillary antrostomies and  partial ethmoidectomies. There is minimal mucosal thickening in the bilateral  frontal sinuses and maxillary sinuses without air-fluid level. The mastoid air  cells and middle ears are clear. The orbital contents are within normal limits.  No significant osseous or scalp lesions are identified.    Impression  1. Unchanged tiny 2 mm nodular focus at the fundus of the right internal  auditory canal, which may represent posttreatment change or residual tiny  vestibular schwannoma.  2. Otherwise unremarkable brain MRI.    Lab Results   Component Value Date    WBC 4.9 07/03/2024    HGB 13.7 07/03/2024    HCT 42.8 07/03/2024    MCV 83.9 07/03/2024     07/03/2024     Lab Results   Component Value Date     07/03/2024    K 3.8 07/03/2024     (H) 07/03/2024    CO2 24 07/03/2024    BUN 14 07/03/2024    CREATININE 1.16 07/03/2024    GLUCOSE 139 (H) 07/03/2024    CALCIUM 8.7 07/03/2024    BILITOT 0.4 07/03/2024    ALKPHOS 90 07/03/2024    AST 23 07/03/2024    ALT 42 07/03/2024    LABGLOM 72 07/03/2024    GFRAA >60 06/22/2022    AGRATIO 1.3 12/16/2022    GLOB 2.9 07/03/2024     TEJA negative    ASSESSMENT   Diagnosis Orders   1. Cervical dystonia        2. Numbness of right foot            DISCUSSION  Mr. Ernesto Haynes has cervical dystonia with dystonic head tremor.  He has

## 2025-01-20 ENCOUNTER — OFFICE VISIT (OUTPATIENT)
Age: 60
End: 2025-01-20
Payer: COMMERCIAL

## 2025-01-20 VITALS
TEMPERATURE: 98.2 F | OXYGEN SATURATION: 96 % | DIASTOLIC BLOOD PRESSURE: 84 MMHG | SYSTOLIC BLOOD PRESSURE: 145 MMHG | HEIGHT: 73 IN | WEIGHT: 198.8 LBS | BODY MASS INDEX: 26.35 KG/M2 | HEART RATE: 95 BPM

## 2025-01-20 DIAGNOSIS — K76.0 FATTY LIVER: Primary | ICD-10-CM

## 2025-01-20 LAB
ALBUMIN SERPL-MCNC: 3.8 G/DL (ref 3.5–5)
ALBUMIN/GLOB SERPL: 1.1 (ref 1.1–2.2)
ALP SERPL-CCNC: 91 U/L (ref 45–117)
ALT SERPL-CCNC: 43 U/L (ref 12–78)
ANION GAP SERPL CALC-SCNC: 3 MMOL/L (ref 2–12)
AST SERPL-CCNC: 21 U/L (ref 15–37)
BASOPHILS # BLD: 0.04 K/UL (ref 0–0.1)
BASOPHILS NFR BLD: 0.7 % (ref 0–1)
BILIRUB SERPL-MCNC: 0.6 MG/DL (ref 0.2–1)
BUN SERPL-MCNC: 12 MG/DL (ref 6–20)
BUN/CREAT SERPL: 10 (ref 12–20)
CALCIUM SERPL-MCNC: 9.2 MG/DL (ref 8.5–10.1)
CHLORIDE SERPL-SCNC: 109 MMOL/L (ref 97–108)
CO2 SERPL-SCNC: 27 MMOL/L (ref 21–32)
CREAT SERPL-MCNC: 1.16 MG/DL (ref 0.7–1.3)
DIFFERENTIAL METHOD BLD: NORMAL
EOSINOPHIL # BLD: 0.11 K/UL (ref 0–0.4)
EOSINOPHIL NFR BLD: 1.8 % (ref 0–7)
ERYTHROCYTE [DISTWIDTH] IN BLOOD BY AUTOMATED COUNT: 13.5 % (ref 11.5–14.5)
GLOBULIN SER CALC-MCNC: 3.4 G/DL (ref 2–4)
GLUCOSE SERPL-MCNC: 89 MG/DL (ref 65–100)
HCT VFR BLD AUTO: 46 % (ref 36.6–50.3)
HGB BLD-MCNC: 14.6 G/DL (ref 12.1–17)
IMM GRANULOCYTES # BLD AUTO: 0.02 K/UL (ref 0–0.04)
IMM GRANULOCYTES NFR BLD AUTO: 0.3 % (ref 0–0.5)
LYMPHOCYTES # BLD: 1.6 K/UL (ref 0.8–3.5)
LYMPHOCYTES NFR BLD: 26.1 % (ref 12–49)
MCH RBC QN AUTO: 26.6 PG (ref 26–34)
MCHC RBC AUTO-ENTMCNC: 31.7 G/DL (ref 30–36.5)
MCV RBC AUTO: 83.8 FL (ref 80–99)
MONOCYTES # BLD: 0.45 K/UL (ref 0–1)
MONOCYTES NFR BLD: 7.3 % (ref 5–13)
NEUTS SEG # BLD: 3.91 K/UL (ref 1.8–8)
NEUTS SEG NFR BLD: 63.8 % (ref 32–75)
NRBC # BLD: 0 K/UL (ref 0–0.01)
NRBC BLD-RTO: 0 PER 100 WBC
PLATELET # BLD AUTO: 224 K/UL (ref 150–400)
PMV BLD AUTO: 9.7 FL (ref 8.9–12.9)
POTASSIUM SERPL-SCNC: 4.5 MMOL/L (ref 3.5–5.1)
PROT SERPL-MCNC: 7.2 G/DL (ref 6.4–8.2)
RBC # BLD AUTO: 5.49 M/UL (ref 4.1–5.7)
SODIUM SERPL-SCNC: 139 MMOL/L (ref 136–145)
WBC # BLD AUTO: 6.1 K/UL (ref 4.1–11.1)

## 2025-01-20 PROCEDURE — 91200 LIVER ELASTOGRAPHY: CPT | Performed by: NURSE PRACTITIONER

## 2025-01-20 PROCEDURE — 99214 OFFICE O/P EST MOD 30 MIN: CPT | Performed by: NURSE PRACTITIONER

## 2025-01-20 RX ORDER — CYCLOBENZAPRINE HCL 5 MG
5 TABLET ORAL DAILY
COMMUNITY

## 2025-01-20 ASSESSMENT — PATIENT HEALTH QUESTIONNAIRE - PHQ9
DEPRESSION UNABLE TO ASSESS: FUNCTIONAL CAPACITY MOTIVATION LIMITS ACCURACY
SUM OF ALL RESPONSES TO PHQ QUESTIONS 1-9: 0
SUM OF ALL RESPONSES TO PHQ QUESTIONS 1-9: 0
1. LITTLE INTEREST OR PLEASURE IN DOING THINGS: NOT AT ALL
SUM OF ALL RESPONSES TO PHQ QUESTIONS 1-9: 0
SUM OF ALL RESPONSES TO PHQ QUESTIONS 1-9: 0
SUM OF ALL RESPONSES TO PHQ9 QUESTIONS 1 & 2: 0
2. FEELING DOWN, DEPRESSED OR HOPELESS: NOT AT ALL

## 2025-01-20 ASSESSMENT — ANXIETY QUESTIONNAIRES
1. FEELING NERVOUS, ANXIOUS, OR ON EDGE: NOT AT ALL
4. TROUBLE RELAXING: NOT AT ALL
2. NOT BEING ABLE TO STOP OR CONTROL WORRYING: NOT AT ALL
7. FEELING AFRAID AS IF SOMETHING AWFUL MIGHT HAPPEN: NOT AT ALL
GAD7 TOTAL SCORE: 0
5. BEING SO RESTLESS THAT IT IS HARD TO SIT STILL: NOT AT ALL
3. WORRYING TOO MUCH ABOUT DIFFERENT THINGS: NOT AT ALL
IF YOU CHECKED OFF ANY PROBLEMS ON THIS QUESTIONNAIRE, HOW DIFFICULT HAVE THESE PROBLEMS MADE IT FOR YOU TO DO YOUR WORK, TAKE CARE OF THINGS AT HOME, OR GET ALONG WITH OTHER PEOPLE: NOT DIFFICULT AT ALL

## 2025-01-20 NOTE — PROGRESS NOTES
Chief Complaint   Patient presents with    New Patient     N/A     Vitals:    01/20/25 1026   BP: (!) 145/84   Site: Left Upper Arm   Position: Sitting   Pulse: 95   Temp: 98.2 °F (36.8 °C)   TempSrc: Temporal   SpO2: 96%   Weight: 90.2 kg (198 lb 12.8 oz)   Height: 1.854 m (6' 1\")     .  \"Have you been to the ER, urgent care clinic since your last visit?  Hospitalized since your last visit?\"    NO    “Have you seen or consulted any other health care providers outside of [facility] since your last visit?”    NO        “Have you had a colorectal cancer screening such as a colonoscopy/FIT/Cologuard?    NO    No colonoscopy on file  No cologuard on file  No FIT/FOBT on file   No flexible sigmoidoscopy on file         Click Here for Release of Records Request

## 2025-01-20 NOTE — PROGRESS NOTES
[facility]      Dallas Mcmahon MD, FACP, FACG, FAASLD      Kaelyn Gorman, PA-MARQUIS Amato, [facility]   Emily Gamezradha, [facility]   Farideh Tyrell, [facility]-  Willi Vigil, [facility]   Anuradha Peng, [facility]   Alley Hernandezon, [facility]   [address], [address]   [place]  [address] [phone]   FAX: [phone]  [facility]   [address], [address]   [place]  [address] [phone]   FAX: [phone]       Patient Care Team:  Peter Oleary MD as PCP - General    Patient Active Problem List   Diagnosis    Fatty liver    History of COVID-19    Chronic cough    H/O sinus surgery    Tremor       Ernesto Haynes is being seen at [facility] for management of elevated liver enzymes that is suspected to be from fatty liver. The active problem list, all pertinent past medical history, medications, radiologic findings and laboratory findings related to the liver disorder were reviewed and discussed with the patient.      The patient is a 59 y.o.  male who was found to have elevated liver transaminases in 2018. The patient was on Augmentin and the elevation was thought to be DILI. The liver enzymes remained elevated after discontinuation of the medication.     Serologic evaluation for markers of chronic liver disease were negative.    Since the last office visit the patient has continued to make dietary changes and there have been no new complications liver disease.    The patient has the following symptoms which are thought to be due to the liver disease: Fatigue.     The patient is not currently experiencing the following symptoms of liver disease:  pain in the right side over the liver, itching, or abdominal swelling.     The patient completes all daily

## 2025-03-07 ENCOUNTER — TELEPHONE (OUTPATIENT)
Age: 60
End: 2025-03-07

## 2025-03-07 NOTE — TELEPHONE ENCOUNTER
Patient states he has surgery on the 3/27/25 and would like to reschedule 3/26/25 Botox to avoid any medication interaction.    Please contact

## 2025-03-11 NOTE — TELEPHONE ENCOUNTER
Patient called and LVM that appointment has been rescheduled for 04/12/2025, and to please call if that needs to be changed. JL

## 2025-04-11 ENCOUNTER — TELEPHONE (OUTPATIENT)
Age: 60
End: 2025-04-11

## 2025-04-11 NOTE — TELEPHONE ENCOUNTER
The patient is asking if his BOTOX appointment on 4/16/2025 can be pushed to later in the day. He states he is unable to drive himself and his wife needs to get the kids on the bus at 8:10 am.

## 2025-04-16 ENCOUNTER — PROCEDURE VISIT (OUTPATIENT)
Age: 60
End: 2025-04-16

## 2025-04-16 VITALS
WEIGHT: 198 LBS | HEIGHT: 73 IN | BODY MASS INDEX: 26.24 KG/M2 | SYSTOLIC BLOOD PRESSURE: 128 MMHG | OXYGEN SATURATION: 98 % | HEART RATE: 94 BPM | DIASTOLIC BLOOD PRESSURE: 84 MMHG

## 2025-04-16 DIAGNOSIS — G24.3 CERVICAL DYSTONIA: Primary | ICD-10-CM

## 2025-04-16 RX ORDER — OXYCODONE AND ACETAMINOPHEN 5; 325 MG/1; MG/1
1 TABLET ORAL EVERY 4 HOURS PRN
COMMUNITY
Start: 2025-01-31

## 2025-04-16 NOTE — PROGRESS NOTES
Chief Complaint   Patient presents with    Procedure     Botox     Vitals:    04/16/25 1038   BP: 128/84   Pulse: 94   SpO2: 98%

## 2025-04-16 NOTE — PROGRESS NOTES
Botox Injection Note       Indication: Cervical dystonia with dystonic head tremor.  He has head tilt to the left, chin turn to the right and no-no type tremor    Procedure:   Botox concentration: 200 units in 2 ml of preservative-free normal saline.     Following muscles were injected under EMG guidance:    1.  Left sternocleidomastoid: 50 units  2.  Left splenius capitis: 35 units  3.  Left splenius cervicis: 15 units  4.  Right sternocleidomastoid: 30 units  5.  Right splenius capitis: 20 units  6.  Right splenius cervicis: 15 units    165 units were reconstituted and injected as above. Patient tolerated procedure well.     _____________________________   Feliberto Park M.D.

## 2025-07-16 ENCOUNTER — OFFICE VISIT (OUTPATIENT)
Age: 60
End: 2025-07-16
Payer: COMMERCIAL

## 2025-07-16 VITALS
HEART RATE: 79 BPM | WEIGHT: 200 LBS | HEIGHT: 73 IN | RESPIRATION RATE: 16 BRPM | DIASTOLIC BLOOD PRESSURE: 64 MMHG | BODY MASS INDEX: 26.51 KG/M2 | OXYGEN SATURATION: 98 % | SYSTOLIC BLOOD PRESSURE: 138 MMHG

## 2025-07-16 DIAGNOSIS — G24.3 CERVICAL DYSTONIA: Primary | ICD-10-CM

## 2025-07-16 DIAGNOSIS — G25.2 DYSTONIC TREMOR: ICD-10-CM

## 2025-07-16 PROCEDURE — 64616 CHEMODENERV MUSC NECK DYSTON: CPT | Performed by: PSYCHIATRY & NEUROLOGY

## 2025-07-16 PROCEDURE — 95874 GUIDE NERV DESTR NEEDLE EMG: CPT | Performed by: PSYCHIATRY & NEUROLOGY

## 2025-07-16 NOTE — PROGRESS NOTES
Botox Injection Note       Indication: Cervical dystonia with dystonic head tremor.  He has head tilt to the left, chin turn to the right and no-no type tremor    Procedure:   Botox concentration: 200 units in 2 ml of preservative-free normal saline.     Following muscles were injected under EMG guidance:    1.  Left sternocleidomastoid: 50 units  2.  Left splenius capitis:  units  3.  Left splenius cervicis: 20 units  4.  Right sternocleidomastoid: 30 units  5.  Right splenius capitis: 20 units  6.  Right splenius cervicis: 20 units  7.  Left semispinalis: 15 units    185 units were reconstituted and injected as above. Patient tolerated procedure well.     _____________________________   Feliberto Park M.D.